# Patient Record
Sex: FEMALE | Race: BLACK OR AFRICAN AMERICAN | Employment: FULL TIME | URBAN - METROPOLITAN AREA
[De-identification: names, ages, dates, MRNs, and addresses within clinical notes are randomized per-mention and may not be internally consistent; named-entity substitution may affect disease eponyms.]

---

## 2018-08-07 ENCOUNTER — OFFICE VISIT (OUTPATIENT)
Dept: INTERNAL MEDICINE CLINIC | Age: 58
End: 2018-08-07

## 2018-08-07 VITALS
HEIGHT: 68 IN | WEIGHT: 222 LBS | HEART RATE: 88 BPM | SYSTOLIC BLOOD PRESSURE: 122 MMHG | DIASTOLIC BLOOD PRESSURE: 80 MMHG | BODY MASS INDEX: 33.65 KG/M2

## 2018-08-07 DIAGNOSIS — G43.109 MIGRAINE WITH AURA AND WITHOUT STATUS MIGRAINOSUS, NOT INTRACTABLE: ICD-10-CM

## 2018-08-07 DIAGNOSIS — E78.5 DYSLIPIDEMIA: ICD-10-CM

## 2018-08-07 DIAGNOSIS — R43.9 DISTURBANCES OF SENSATION OF SMELL AND TASTE: ICD-10-CM

## 2018-08-07 DIAGNOSIS — E11.9 TYPE 2 DIABETES MELLITUS WITHOUT COMPLICATION, WITHOUT LONG-TERM CURRENT USE OF INSULIN (HCC): ICD-10-CM

## 2018-08-07 DIAGNOSIS — Z80.0 FAMILY HISTORY OF COLON CANCER: ICD-10-CM

## 2018-08-07 DIAGNOSIS — Z12.11 COLON CANCER SCREENING: ICD-10-CM

## 2018-08-07 DIAGNOSIS — Z76.89 ENCOUNTER TO ESTABLISH CARE: Primary | ICD-10-CM

## 2018-08-07 PROCEDURE — 99204 OFFICE O/P NEW MOD 45 MIN: CPT | Performed by: NURSE PRACTITIONER

## 2018-08-07 RX ORDER — SUMATRIPTAN 5 MG/1
1 SPRAY NASAL DAILY PRN
COMMUNITY
End: 2019-02-01 | Stop reason: ALTCHOICE

## 2018-08-07 RX ORDER — PRAVASTATIN SODIUM 20 MG
20 TABLET ORAL DAILY
COMMUNITY
Start: 2018-08-05

## 2018-08-07 RX ORDER — RIZATRIPTAN BENZOATE 10 MG/1
10 TABLET ORAL
COMMUNITY
End: 2019-02-01 | Stop reason: ALTCHOICE

## 2018-08-07 RX ORDER — AZELASTINE HCL 205.5 UG/1
SPRAY NASAL
Refills: 1 | COMMUNITY
Start: 2018-06-11 | End: 2019-07-09 | Stop reason: SDUPTHER

## 2018-08-07 RX ORDER — TRANDOLAPRIL AND VERAPAMIL HYDROCHLORIDE 2; 180 MG/1; MG/1
1 TABLET, FILM COATED, EXTENDED RELEASE ORAL DAILY
Refills: 0 | COMMUNITY
Start: 2018-07-25 | End: 2019-02-01 | Stop reason: ALTCHOICE

## 2018-08-07 RX ORDER — FLUTICASONE PROPIONATE 50 MCG
2 SPRAY, SUSPENSION (ML) NASAL DAILY
COMMUNITY
Start: 2018-08-06 | End: 2019-08-14 | Stop reason: SDUPTHER

## 2018-08-07 RX ORDER — CETIRIZINE HYDROCHLORIDE 10 MG/1
TABLET ORAL
Refills: 3 | COMMUNITY
Start: 2018-08-01 | End: 2018-11-26 | Stop reason: SDUPTHER

## 2018-08-07 ASSESSMENT — PATIENT HEALTH QUESTIONNAIRE - PHQ9
SUM OF ALL RESPONSES TO PHQ QUESTIONS 1-9: 0
SUM OF ALL RESPONSES TO PHQ9 QUESTIONS 1 & 2: 0
2. FEELING DOWN, DEPRESSED OR HOPELESS: 0
1. LITTLE INTEREST OR PLEASURE IN DOING THINGS: 0

## 2018-08-07 NOTE — PROGRESS NOTES
SUBJECTIVE:    Patient ID: Yessenia Hernandez is a 65 y.o. female. CC: New patient appointment    HPI: The patient presents to the office to establish with a new primary care provider. She recently relocated to [place] from [place] for work. She was getting regular primary care from Dr. Yolanda Bruce in [place], [place]. She reports a history of diabetes. She feels this is very well controlled. Her glucose this morning was 96. She believes her most recent A1c was 6.0. She tells me her A1c was just done recently. She has a history of dyslipidemia: She is on pravastatin. She is compliant with her medication regimen. Migraines: She tells me she has a history of migraines with aura. She takes Tarka daily for prophylaxis and has used both Imitrex and Maxalt for abortive therapies. She tells me she recently changed some of the food she was eating has noticed a significant improvement in her migraines. Her main complaint today is that she has a sensation of smelling exhaust fumes smell all the time. She smells this at home, outdoors, and at work. This has been occurring for over a year. She has received regular gynecologic care from Dr. Lisa Ruff. Dr. Lisa Ruff was treating the patient for recurrent UTIs. She tells me her last Pap was August 2017 and normal.    She had a colonoscopy 8 years ago which she states was normal.      She is . She has 5 grown children. She works for [employer]    She does not smoke tobacco.  She drinks alcohol rarely. She denies illicit drug use. Past Medical History:   Diagnosis Date    Diabetes mellitus ([address]     Dyslipidemia     Hemorrhoids     History of recurrent UTIs     Migraine with aura         History reviewed. No pertinent surgical history.       Family History   Problem Relation Age of Onset    High Blood Pressure Mother     Diabetes Mother     High Blood Pressure Maternal Grandmother     Cancer Maternal Grandmother     Migraines Maternal Grandmother     Diabetes Maternal Grandfather     Glaucoma Paternal Grandmother        Social History     Social History    Marital status:      Spouse name: N/A    Number of children: N/A    Years of education: N/A     Occupational History    Not on file. Social History Main Topics    Smoking status: Never Smoker    Smokeless tobacco: Never Used    Alcohol use Yes      Comment: rarely    Drug use: No    Sexual activity: Not Currently     Other Topics Concern    Not on file     Social History Narrative    No narrative on file       No current outpatient prescriptions on file prior to visit. No current facility-administered medications on file prior to visit. Allergies   Allergen Reactions    Codeine          Review of Systems   Constitutional: Negative for chills and fever. HENT: Positive for congestion and postnasal drip. Negative for sore throat. Sensation of smelling exhaust fumes   Respiratory: Negative. Cardiovascular: Negative. Gastrointestinal: Negative. Genitourinary: Negative. Skin: Negative. Psychiatric/Behavioral: Negative. All other systems reviewed and are negative. OBJECTIVE:  Physical Exam   Constitutional: She is oriented to person, place, and time. She appears well-developed and well-nourished. No distress. HENT:   Head: Normocephalic and atraumatic. Eyes: Conjunctivae are normal. No scleral icterus. Neck: Neck supple. No JVD present. Cardiovascular: Normal rate and regular rhythm. Pulmonary/Chest: Effort normal and breath sounds normal. No respiratory distress. She has no wheezes. Abdominal: Soft. She exhibits no distension. There is no tenderness. There is no rebound and no guarding. Musculoskeletal: Normal range of motion. She exhibits no edema. Neurological: She is alert and oriented to person, place, and time. Skin: Skin is warm and dry. She is not diaphoretic.    Psychiatric: She has a normal

## 2018-08-09 PROBLEM — E78.5 DYSLIPIDEMIA: Status: ACTIVE | Noted: 2018-08-09

## 2018-08-09 PROBLEM — Z80.0 FAMILY HISTORY OF COLON CANCER: Status: ACTIVE | Noted: 2018-08-09

## 2018-08-09 PROBLEM — G43.109 MIGRAINE WITH AURA AND WITHOUT STATUS MIGRAINOSUS, NOT INTRACTABLE: Status: ACTIVE | Noted: 2018-08-09

## 2018-08-09 PROBLEM — E11.9 TYPE 2 DIABETES MELLITUS WITHOUT COMPLICATION, WITHOUT LONG-TERM CURRENT USE OF INSULIN (HCC): Status: ACTIVE | Noted: 2018-08-09

## 2018-08-09 ASSESSMENT — ENCOUNTER SYMPTOMS
SORE THROAT: 0
RESPIRATORY NEGATIVE: 1
GASTROINTESTINAL NEGATIVE: 1

## 2018-11-27 RX ORDER — CETIRIZINE HYDROCHLORIDE 10 MG/1
TABLET ORAL
Qty: 90 TABLET | Refills: 0 | Status: SHIPPED | OUTPATIENT
Start: 2018-11-27 | End: 2019-05-13 | Stop reason: SDUPTHER

## 2018-12-31 ENCOUNTER — HOSPITAL ENCOUNTER (EMERGENCY)
Age: 58
Discharge: HOME | End: 2018-12-31
Attending: EMERGENCY MEDICINE
Payer: COMMERCIAL

## 2018-12-31 ENCOUNTER — APPOINTMENT (OUTPATIENT)
Dept: GENERAL RADIOLOGY | Age: 58
End: 2018-12-31
Payer: COMMERCIAL

## 2018-12-31 VITALS
TEMPERATURE: 97.8 F | HEIGHT: 67 IN | OXYGEN SATURATION: 97 % | BODY MASS INDEX: 34.53 KG/M2 | DIASTOLIC BLOOD PRESSURE: 75 MMHG | WEIGHT: 220 LBS | HEART RATE: 86 BPM | SYSTOLIC BLOOD PRESSURE: 142 MMHG | RESPIRATION RATE: 17 BRPM
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
## 2018-12-31 DIAGNOSIS — S86.912A STRAIN OF LEFT KNEE AND LEG, INITIAL ENCOUNTER: ICD-10-CM

## 2018-12-31 PROCEDURE — 73552 X-RAY EXAM OF FEMUR 2/>: CPT

## 2018-12-31 PROCEDURE — 73502 X-RAY EXAM HIP UNI 2-3 VIEWS: CPT

## 2018-12-31 PROCEDURE — 6360000002 HC RX W HCPCS: Performed by: EMERGENCY MEDICINE

## 2018-12-31 PROCEDURE — 99283 EMERGENCY DEPT VISIT LOW MDM: CPT

## 2018-12-31 PROCEDURE — 6370000000 HC RX 637 (ALT 250 FOR IP): Performed by: EMERGENCY MEDICINE

## 2018-12-31 PROCEDURE — 96372 THER/PROPH/DIAG INJ SC/IM: CPT

## 2018-12-31 PROCEDURE — 73560 X-RAY EXAM OF KNEE 1 OR 2: CPT

## 2018-12-31 RX ORDER — LIDOCAINE 4 G/G
1 PATCH TOPICAL DAILY
Status: DISCONTINUED | OUTPATIENT
Start: 2018-12-31 | End: 2018-12-31 | Stop reason: HOSPADM

## 2018-12-31 RX ORDER — KETOROLAC TROMETHAMINE 30 MG/ML
15 INJECTION, SOLUTION INTRAMUSCULAR; INTRAVENOUS ONCE
Status: COMPLETED | OUTPATIENT
Start: 2018-12-31 | End: 2018-12-31

## 2018-12-31 RX ORDER — CYCLOBENZAPRINE HCL 10 MG
10 TABLET ORAL 3 TIMES DAILY PRN
Qty: 30 TABLET | Refills: 0 | Status: SHIPPED | OUTPATIENT
Start: 2018-12-31 | End: 2019-01-14 | Stop reason: SDUPTHER

## 2018-12-31 RX ORDER — NAPROXEN 375 MG/1
375 TABLET ORAL 2 TIMES DAILY PRN
Qty: 30 TABLET | Refills: 0 | Status: SHIPPED | OUTPATIENT
Start: 2018-12-31 | End: 2019-01-07

## 2018-12-31 RX ADMIN — KETOROLAC TROMETHAMINE 15 MG: 30 INJECTION, SOLUTION INTRAMUSCULAR at 19:27

## 2018-12-31 ASSESSMENT — PAIN SCALES - GENERAL: PAINLEVEL_OUTOF10: 10

## 2019-01-07 ENCOUNTER — OFFICE VISIT (OUTPATIENT)
Dept: INTERNAL MEDICINE CLINIC | Age: 59
End: 2019-01-07
Payer: COMMERCIAL

## 2019-01-07 VITALS
BODY MASS INDEX: 35.31 KG/M2 | DIASTOLIC BLOOD PRESSURE: 86 MMHG | HEIGHT: 67 IN | WEIGHT: 225 LBS | HEART RATE: 116 BPM | SYSTOLIC BLOOD PRESSURE: 132 MMHG

## 2019-01-07 DIAGNOSIS — M79.605 LEFT LEG PAIN: ICD-10-CM

## 2019-01-07 DIAGNOSIS — S80.12XA HEMATOMA OF LEG, LEFT, INITIAL ENCOUNTER: Primary | ICD-10-CM

## 2019-01-07 DIAGNOSIS — R26.2 UNABLE TO AMBULATE: ICD-10-CM

## 2019-01-07 DIAGNOSIS — E11.9 TYPE 2 DIABETES MELLITUS WITHOUT COMPLICATION, WITHOUT LONG-TERM CURRENT USE OF INSULIN (HCC): ICD-10-CM

## 2019-01-07 LAB — HBA1C MFR BLD: 8.7 %

## 2019-01-07 PROCEDURE — 99213 OFFICE O/P EST LOW 20 MIN: CPT | Performed by: NURSE PRACTITIONER

## 2019-01-07 PROCEDURE — 83036 HEMOGLOBIN GLYCOSYLATED A1C: CPT | Performed by: NURSE PRACTITIONER

## 2019-01-07 RX ORDER — HYDROCODONE BITARTRATE AND ACETAMINOPHEN 5; 325 MG/1; MG/1
1 TABLET ORAL EVERY 8 HOURS PRN
Qty: 20 TABLET | Refills: 0 | Status: SHIPPED | OUTPATIENT
Start: 2019-01-07 | End: 2019-01-14

## 2019-01-07 ASSESSMENT — ENCOUNTER SYMPTOMS: RESPIRATORY NEGATIVE: 1

## 2019-01-10 ENCOUNTER — HOSPITAL ENCOUNTER (OUTPATIENT)
Dept: PHYSICAL THERAPY | Age: 59
Setting detail: THERAPIES SERIES
Discharge: HOME | End: 2019-01-10
Payer: COMMERCIAL

## 2019-01-10 ENCOUNTER — TELEPHONE (OUTPATIENT)
Dept: INTERNAL MEDICINE CLINIC | Age: 59
End: 2019-01-10
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
## 2019-01-10 DIAGNOSIS — R26.2 UNABLE TO AMBULATE: ICD-10-CM

## 2019-01-10 DIAGNOSIS — S76.302A LEFT HAMSTRING INJURY, INITIAL ENCOUNTER: Primary | ICD-10-CM

## 2019-01-10 DIAGNOSIS — M79.652 ACUTE PAIN OF LEFT THIGH: ICD-10-CM

## 2019-01-10 PROCEDURE — 97112 NEUROMUSCULAR REEDUCATION: CPT

## 2019-01-10 PROCEDURE — 97162 PT EVAL MOD COMPLEX 30 MIN: CPT

## 2019-01-10 PROCEDURE — G8979 MOBILITY GOAL STATUS: HCPCS

## 2019-01-10 PROCEDURE — G8978 MOBILITY CURRENT STATUS: HCPCS

## 2019-01-11 ENCOUNTER — TELEPHONE (OUTPATIENT)
Dept: INTERNAL MEDICINE CLINIC | Age: 59
End: 2019-01-11

## 2019-01-14 ENCOUNTER — TELEPHONE (OUTPATIENT)
Dept: INTERNAL MEDICINE CLINIC | Age: 59
End: 2019-01-14

## 2019-01-14 RX ORDER — CYCLOBENZAPRINE HCL 10 MG
10 TABLET ORAL 3 TIMES DAILY PRN
Qty: 30 TABLET | Refills: 0 | Status: SHIPPED | OUTPATIENT
Start: 2019-01-14 | End: 2019-01-24

## 2019-01-16 ENCOUNTER — OFFICE VISIT (OUTPATIENT)
Dept: ORTHOPEDIC SURGERY | Age: 59
End: 2019-01-16
Payer: COMMERCIAL

## 2019-01-16 VITALS
WEIGHT: 224 LBS | SYSTOLIC BLOOD PRESSURE: 124 MMHG | BODY MASS INDEX: 35.16 KG/M2 | HEIGHT: 67 IN | DIASTOLIC BLOOD PRESSURE: 83 MMHG | HEART RATE: 135 BPM

## 2019-01-16 DIAGNOSIS — F40.240 CLAUSTROPHOBIA: ICD-10-CM

## 2019-01-16 DIAGNOSIS — S76.312A TEAR OF LEFT HAMSTRING, INITIAL ENCOUNTER: Primary | ICD-10-CM

## 2019-01-16 PROCEDURE — 99203 OFFICE O/P NEW LOW 30 MIN: CPT | Performed by: ORTHOPAEDIC SURGERY

## 2019-01-24 ENCOUNTER — HOSPITAL ENCOUNTER (OUTPATIENT)
Dept: MRI IMAGING | Age: 59
Discharge: HOME | End: 2019-01-24
Payer: COMMERCIAL

## 2019-01-24 ENCOUNTER — TELEPHONE (OUTPATIENT)
Dept: ORTHOPEDIC SURGERY | Age: 59
End: 2019-01-24

## 2019-01-24 DIAGNOSIS — S76.312A TEAR OF LEFT HAMSTRING, INITIAL ENCOUNTER: ICD-10-CM

## 2019-01-24 RX ORDER — DIAZEPAM 5 MG/1
TABLET ORAL
Qty: 2 TABLET | Refills: 0 | Status: CANCELLED | OUTPATIENT
Start: 2019-01-24 | End: 2019-01-31

## 2019-01-24 RX ORDER — DIAZEPAM 5 MG/1
5 TABLET ORAL ONCE
Qty: 2 TABLET | Refills: 0 | Status: SHIPPED | OUTPATIENT
Start: 2019-01-24 | End: 2019-01-24

## 2019-01-28 ENCOUNTER — TELEPHONE (OUTPATIENT)
Dept: ORTHOPEDIC SURGERY | Age: 59
End: 2019-01-28

## 2019-01-31 ENCOUNTER — OFFICE VISIT (OUTPATIENT)
Dept: ORTHOPEDIC SURGERY | Age: 59
End: 2019-01-31
Payer: COMMERCIAL

## 2019-01-31 VITALS
DIASTOLIC BLOOD PRESSURE: 92 MMHG | HEART RATE: 111 BPM | SYSTOLIC BLOOD PRESSURE: 149 MMHG | WEIGHT: 228 LBS | HEIGHT: 67 IN | RESPIRATION RATE: 17 BRPM | BODY MASS INDEX: 35.79 KG/M2

## 2019-01-31 DIAGNOSIS — S76.312A RUPTURE OF LEFT PROXIMAL HAMSTRING TENDON, INITIAL ENCOUNTER: Primary | ICD-10-CM

## 2019-01-31 PROCEDURE — 99214 OFFICE O/P EST MOD 30 MIN: CPT | Performed by: ORTHOPAEDIC SURGERY

## 2019-01-31 PROCEDURE — E0114 CRUTCH UNDERARM PAIR NO WOOD: HCPCS | Performed by: ORTHOPAEDIC SURGERY

## 2019-01-31 PROCEDURE — L1686 HO POST-OP HIP ABDUCTION: HCPCS | Performed by: ORTHOPAEDIC SURGERY

## 2019-02-01 ENCOUNTER — TELEPHONE (OUTPATIENT)
Dept: ORTHOPEDIC SURGERY | Age: 59
End: 2019-02-01

## 2019-02-04 ENCOUNTER — OFFICE VISIT (OUTPATIENT)
Dept: INTERNAL MEDICINE CLINIC | Age: 59
End: 2019-02-04
Payer: COMMERCIAL

## 2019-02-04 VITALS
HEART RATE: 104 BPM | WEIGHT: 223 LBS | HEIGHT: 67 IN | DIASTOLIC BLOOD PRESSURE: 86 MMHG | BODY MASS INDEX: 35 KG/M2 | SYSTOLIC BLOOD PRESSURE: 138 MMHG

## 2019-02-04 DIAGNOSIS — Z01.818 PRE-OPERATIVE EXAM: Primary | ICD-10-CM

## 2019-02-04 DIAGNOSIS — S76.392A AVULSION OF LEFT HAMSTRING MUSCLE, INITIAL ENCOUNTER: ICD-10-CM

## 2019-02-04 PROCEDURE — 99242 OFF/OP CONSLTJ NEW/EST SF 20: CPT | Performed by: NURSE PRACTITIONER

## 2019-02-05 ENCOUNTER — TELEPHONE (OUTPATIENT)
Dept: ORTHOPEDIC SURGERY | Age: 59
End: 2019-02-05

## 2019-02-06 ENCOUNTER — ANESTHESIA EVENT (OUTPATIENT)
Dept: OPERATING ROOM | Age: 59
End: 2019-02-06
Payer: COMMERCIAL

## 2019-02-06 ENCOUNTER — APPOINTMENT (OUTPATIENT)
Dept: GENERAL RADIOLOGY | Age: 59
End: 2019-02-06
Attending: ORTHOPAEDIC SURGERY
Payer: COMMERCIAL

## 2019-02-06 ENCOUNTER — ANESTHESIA (OUTPATIENT)
Dept: OPERATING ROOM | Age: 59
End: 2019-02-06
Payer: COMMERCIAL

## 2019-02-06 ENCOUNTER — HOSPITAL ENCOUNTER (OUTPATIENT)
Age: 59
Setting detail: OUTPATIENT SURGERY
Discharge: HOME | End: 2019-02-06
Attending: ORTHOPAEDIC SURGERY | Admitting: ORTHOPAEDIC SURGERY
Payer: COMMERCIAL

## 2019-02-06 VITALS
RESPIRATION RATE: 14 BRPM | DIASTOLIC BLOOD PRESSURE: 92 MMHG | TEMPERATURE: 95.9 F | SYSTOLIC BLOOD PRESSURE: 141 MMHG | OXYGEN SATURATION: 100 %

## 2019-02-06 VITALS
SYSTOLIC BLOOD PRESSURE: 140 MMHG | HEIGHT: 67 IN | TEMPERATURE: 98.4 F | DIASTOLIC BLOOD PRESSURE: 87 MMHG | BODY MASS INDEX: 34.48 KG/M2 | RESPIRATION RATE: 16 BRPM | OXYGEN SATURATION: 100 % | HEART RATE: 90 BPM | WEIGHT: 219.7 LBS

## 2019-02-06 DIAGNOSIS — S76.312A COMPLETE RUPTURE OF LEFT PROXIMAL HAMSTRING TENDON, INITIAL ENCOUNTER: Primary | ICD-10-CM

## 2019-02-06 LAB
GLUCOSE BLD-MCNC: 175 MG/DL (ref 70–99)
GLUCOSE BLD-MCNC: 196 MG/DL (ref 70–99)
PERFORMED ON: ABNORMAL
PERFORMED ON: ABNORMAL

## 2019-02-06 PROCEDURE — 3700000001 HC ADD 15 MINUTES (ANESTHESIA): Performed by: ORTHOPAEDIC SURGERY

## 2019-02-06 PROCEDURE — 2500000003 HC RX 250 WO HCPCS: Performed by: NURSE ANESTHETIST, CERTIFIED REGISTERED

## 2019-02-06 PROCEDURE — 2580000003 HC RX 258: Performed by: NURSE ANESTHETIST, CERTIFIED REGISTERED

## 2019-02-06 PROCEDURE — 7100000000 HC PACU RECOVERY - FIRST 15 MIN: Performed by: ORTHOPAEDIC SURGERY

## 2019-02-06 PROCEDURE — 2500000003 HC RX 250 WO HCPCS: Performed by: ORTHOPAEDIC SURGERY

## 2019-02-06 PROCEDURE — 2709999900 HC NON-CHARGEABLE SUPPLY: Performed by: ORTHOPAEDIC SURGERY

## 2019-02-06 PROCEDURE — 7100000010 HC PHASE II RECOVERY - FIRST 15 MIN: Performed by: ORTHOPAEDIC SURGERY

## 2019-02-06 PROCEDURE — 3600000014 HC SURGERY LEVEL 4 ADDTL 15MIN: Performed by: ORTHOPAEDIC SURGERY

## 2019-02-06 PROCEDURE — 2580000003 HC RX 258: Performed by: ORTHOPAEDIC SURGERY

## 2019-02-06 PROCEDURE — 3700000000 HC ANESTHESIA ATTENDED CARE: Performed by: ORTHOPAEDIC SURGERY

## 2019-02-06 PROCEDURE — 7100000011 HC PHASE II RECOVERY - ADDTL 15 MIN: Performed by: ORTHOPAEDIC SURGERY

## 2019-02-06 PROCEDURE — 7100000001 HC PACU RECOVERY - ADDTL 15 MIN: Performed by: ORTHOPAEDIC SURGERY

## 2019-02-06 PROCEDURE — 27385 REPAIR OF THIGH MUSCLE: CPT | Performed by: ORTHOPAEDIC SURGERY

## 2019-02-06 PROCEDURE — 3600000004 HC SURGERY LEVEL 4 BASE: Performed by: ORTHOPAEDIC SURGERY

## 2019-02-06 PROCEDURE — 6360000002 HC RX W HCPCS: Performed by: FAMILY MEDICINE

## 2019-02-06 PROCEDURE — 6360000002 HC RX W HCPCS: Performed by: NURSE ANESTHETIST, CERTIFIED REGISTERED

## 2019-02-06 PROCEDURE — 3209999900 FLUORO FOR SURGICAL PROCEDURES

## 2019-02-06 PROCEDURE — 6370000000 HC RX 637 (ALT 250 FOR IP): Performed by: FAMILY MEDICINE

## 2019-02-06 PROCEDURE — C1713 ANCHOR/SCREW BN/BN,TIS/BN: HCPCS | Performed by: ORTHOPAEDIC SURGERY

## 2019-02-06 PROCEDURE — 6360000002 HC RX W HCPCS: Performed by: ORTHOPAEDIC SURGERY

## 2019-02-06 DEVICE — ANCHOR SUT L14MM OD4.5MM BIOCOMP TWO NO 2 FIBERWIRE 4 NDL: Type: IMPLANTABLE DEVICE | Site: LEG | Status: FUNCTIONAL

## 2019-02-06 DEVICE — ANCHOR SUT L14MM DIA4.5MM BIOCOMP FULL THRD W/ DRL BIT GUID: Type: IMPLANTABLE DEVICE | Site: LEG | Status: FUNCTIONAL

## 2019-02-06 RX ORDER — LABETALOL HYDROCHLORIDE 5 MG/ML
5 INJECTION, SOLUTION INTRAVENOUS EVERY 10 MIN PRN
Status: DISCONTINUED | OUTPATIENT
Start: 2019-02-06 | End: 2019-02-06 | Stop reason: HOSPADM

## 2019-02-06 RX ORDER — HYDROMORPHONE HCL 110MG/55ML
PATIENT CONTROLLED ANALGESIA SYRINGE INTRAVENOUS PRN
Status: DISCONTINUED | OUTPATIENT
Start: 2019-02-06 | End: 2019-02-06 | Stop reason: SDUPTHER

## 2019-02-06 RX ORDER — SODIUM CHLORIDE 9 MG/ML
INJECTION, SOLUTION INTRAVENOUS CONTINUOUS
Status: DISCONTINUED | OUTPATIENT
Start: 2019-02-06 | End: 2019-02-06 | Stop reason: HOSPADM

## 2019-02-06 RX ORDER — SODIUM CHLORIDE 9 MG/ML
INJECTION, SOLUTION INTRAVENOUS CONTINUOUS PRN
Status: DISCONTINUED | OUTPATIENT
Start: 2019-02-06 | End: 2019-02-06 | Stop reason: SDUPTHER

## 2019-02-06 RX ORDER — DIPHENHYDRAMINE HYDROCHLORIDE 50 MG/ML
12.5 INJECTION INTRAMUSCULAR; INTRAVENOUS
Status: DISCONTINUED | OUTPATIENT
Start: 2019-02-06 | End: 2019-02-06 | Stop reason: HOSPADM

## 2019-02-06 RX ORDER — NEOSTIGMINE METHYLSULFATE 1 MG/ML
INJECTION, SOLUTION INTRAVENOUS PRN
Status: DISCONTINUED | OUTPATIENT
Start: 2019-02-06 | End: 2019-02-06 | Stop reason: SDUPTHER

## 2019-02-06 RX ORDER — BUPIVACAINE HYDROCHLORIDE 5 MG/ML
INJECTION, SOLUTION PERINEURAL
Status: COMPLETED | OUTPATIENT
Start: 2019-02-06 | End: 2019-02-06

## 2019-02-06 RX ORDER — PROMETHAZINE HYDROCHLORIDE 25 MG/1
25 TABLET ORAL EVERY 6 HOURS PRN
Qty: 5 TABLET | Refills: 0 | Status: SHIPPED | OUTPATIENT
Start: 2019-02-06 | End: 2019-02-25

## 2019-02-06 RX ORDER — DIPHENHYDRAMINE HYDROCHLORIDE 50 MG/ML
12.5 INJECTION INTRAMUSCULAR; INTRAVENOUS
Status: COMPLETED | OUTPATIENT
Start: 2019-02-06 | End: 2019-02-06

## 2019-02-06 RX ORDER — MAGNESIUM HYDROXIDE 1200 MG/15ML
LIQUID ORAL CONTINUOUS PRN
Status: COMPLETED | OUTPATIENT
Start: 2019-02-06 | End: 2019-02-06

## 2019-02-06 RX ORDER — ONDANSETRON 2 MG/ML
INJECTION INTRAMUSCULAR; INTRAVENOUS PRN
Status: DISCONTINUED | OUTPATIENT
Start: 2019-02-06 | End: 2019-02-06 | Stop reason: SDUPTHER

## 2019-02-06 RX ORDER — PROMETHAZINE HYDROCHLORIDE 25 MG/ML
6.25 INJECTION, SOLUTION INTRAMUSCULAR; INTRAVENOUS PRN
Status: DISCONTINUED | OUTPATIENT
Start: 2019-02-06 | End: 2019-02-06 | Stop reason: HOSPADM

## 2019-02-06 RX ORDER — HYDROMORPHONE HCL 110MG/55ML
0.5 PATIENT CONTROLLED ANALGESIA SYRINGE INTRAVENOUS EVERY 5 MIN PRN
Status: DISCONTINUED | OUTPATIENT
Start: 2019-02-06 | End: 2019-02-06 | Stop reason: HOSPADM

## 2019-02-06 RX ORDER — HYDROMORPHONE HCL 110MG/55ML
0.25 PATIENT CONTROLLED ANALGESIA SYRINGE INTRAVENOUS EVERY 5 MIN PRN
Status: DISCONTINUED | OUTPATIENT
Start: 2019-02-06 | End: 2019-02-06 | Stop reason: HOSPADM

## 2019-02-06 RX ORDER — SODIUM CHLORIDE 0.9 % (FLUSH) 0.9 %
10 SYRINGE (ML) INJECTION PRN
Status: DISCONTINUED | OUTPATIENT
Start: 2019-02-06 | End: 2019-02-06 | Stop reason: HOSPADM

## 2019-02-06 RX ORDER — CEFAZOLIN SODIUM 2 G/100ML
2 INJECTION, SOLUTION INTRAVENOUS
Status: COMPLETED | OUTPATIENT
Start: 2019-02-06 | End: 2019-02-06

## 2019-02-06 RX ORDER — SUCCINYLCHOLINE/SOD CL,ISO/PF 100 MG/5ML
SYRINGE (ML) INTRAVENOUS PRN
Status: DISCONTINUED | OUTPATIENT
Start: 2019-02-06 | End: 2019-02-06 | Stop reason: SDUPTHER

## 2019-02-06 RX ORDER — OXYCODONE HYDROCHLORIDE 5 MG/1
10 TABLET ORAL PRN
Status: COMPLETED | OUTPATIENT
Start: 2019-02-06 | End: 2019-02-06

## 2019-02-06 RX ORDER — SODIUM CHLORIDE 0.9 % (FLUSH) 0.9 %
10 SYRINGE (ML) INJECTION EVERY 12 HOURS SCHEDULED
Status: DISCONTINUED | OUTPATIENT
Start: 2019-02-06 | End: 2019-02-06 | Stop reason: HOSPADM

## 2019-02-06 RX ORDER — MIDAZOLAM HYDROCHLORIDE 5 MG/ML
INJECTION INTRAMUSCULAR; INTRAVENOUS PRN
Status: DISCONTINUED | OUTPATIENT
Start: 2019-02-06 | End: 2019-02-06 | Stop reason: SDUPTHER

## 2019-02-06 RX ORDER — PROPOFOL 10 MG/ML
INJECTION, EMULSION INTRAVENOUS PRN
Status: DISCONTINUED | OUTPATIENT
Start: 2019-02-06 | End: 2019-02-06 | Stop reason: SDUPTHER

## 2019-02-06 RX ORDER — LIDOCAINE HYDROCHLORIDE 20 MG/ML
INJECTION, SOLUTION EPIDURAL; INFILTRATION; INTRACAUDAL; PERINEURAL PRN
Status: DISCONTINUED | OUTPATIENT
Start: 2019-02-06 | End: 2019-02-06 | Stop reason: SDUPTHER

## 2019-02-06 RX ORDER — FENTANYL CITRATE 50 UG/ML
INJECTION, SOLUTION INTRAMUSCULAR; INTRAVENOUS PRN
Status: DISCONTINUED | OUTPATIENT
Start: 2019-02-06 | End: 2019-02-06 | Stop reason: SDUPTHER

## 2019-02-06 RX ORDER — ROCURONIUM BROMIDE 10 MG/ML
INJECTION, SOLUTION INTRAVENOUS PRN
Status: DISCONTINUED | OUTPATIENT
Start: 2019-02-06 | End: 2019-02-06 | Stop reason: SDUPTHER

## 2019-02-06 RX ORDER — OXYCODONE HYDROCHLORIDE AND ACETAMINOPHEN 5; 325 MG/1; MG/1
1 TABLET ORAL EVERY 4 HOURS PRN
Qty: 40 TABLET | Refills: 0 | Status: SHIPPED | OUTPATIENT
Start: 2019-02-06 | End: 2019-02-13

## 2019-02-06 RX ORDER — LIDOCAINE HYDROCHLORIDE 10 MG/ML
0.5 INJECTION, SOLUTION EPIDURAL; INFILTRATION; INTRACAUDAL; PERINEURAL ONCE
Status: DISCONTINUED | OUTPATIENT
Start: 2019-02-06 | End: 2019-02-06 | Stop reason: HOSPADM

## 2019-02-06 RX ORDER — FENTANYL CITRATE 50 UG/ML
50 INJECTION, SOLUTION INTRAMUSCULAR; INTRAVENOUS EVERY 5 MIN PRN
Status: DISCONTINUED | OUTPATIENT
Start: 2019-02-06 | End: 2019-02-06 | Stop reason: HOSPADM

## 2019-02-06 RX ORDER — GLYCOPYRROLATE 0.2 MG/ML
INJECTION INTRAMUSCULAR; INTRAVENOUS PRN
Status: DISCONTINUED | OUTPATIENT
Start: 2019-02-06 | End: 2019-02-06 | Stop reason: SDUPTHER

## 2019-02-06 RX ORDER — MEPERIDINE HYDROCHLORIDE 25 MG/ML
12.5 INJECTION INTRAMUSCULAR; INTRAVENOUS; SUBCUTANEOUS EVERY 5 MIN PRN
Status: DISCONTINUED | OUTPATIENT
Start: 2019-02-06 | End: 2019-02-06 | Stop reason: HOSPADM

## 2019-02-06 RX ORDER — OXYCODONE HYDROCHLORIDE 5 MG/1
5 TABLET ORAL PRN
Status: COMPLETED | OUTPATIENT
Start: 2019-02-06 | End: 2019-02-06

## 2019-02-06 RX ORDER — DEXAMETHASONE SODIUM PHOSPHATE 4 MG/ML
INJECTION, SOLUTION INTRA-ARTICULAR; INTRALESIONAL; INTRAMUSCULAR; INTRAVENOUS; SOFT TISSUE PRN
Status: DISCONTINUED | OUTPATIENT
Start: 2019-02-06 | End: 2019-02-06 | Stop reason: SDUPTHER

## 2019-02-06 RX ADMIN — HYDROMORPHONE HYDROCHLORIDE 0.5 MG: 2 INJECTION, SOLUTION INTRAMUSCULAR; INTRAVENOUS; SUBCUTANEOUS at 09:23

## 2019-02-06 RX ADMIN — Medication 100 MG: at 07:34

## 2019-02-06 RX ADMIN — ONDANSETRON 4 MG: 2 INJECTION INTRAMUSCULAR; INTRAVENOUS at 09:05

## 2019-02-06 RX ADMIN — ROCURONIUM BROMIDE 5 MG: 10 INJECTION, SOLUTION INTRAVENOUS at 07:34

## 2019-02-06 RX ADMIN — PROPOFOL 200 MG: 10 INJECTION, EMULSION INTRAVENOUS at 07:34

## 2019-02-06 RX ADMIN — FENTANYL CITRATE 25 MCG: 50 INJECTION, SOLUTION INTRAMUSCULAR; INTRAVENOUS at 08:09

## 2019-02-06 RX ADMIN — ROCURONIUM BROMIDE 25 MG: 10 INJECTION, SOLUTION INTRAVENOUS at 08:00

## 2019-02-06 RX ADMIN — SODIUM CHLORIDE: 9 INJECTION, SOLUTION INTRAVENOUS at 06:49

## 2019-02-06 RX ADMIN — LIDOCAINE HYDROCHLORIDE 60 MG: 20 INJECTION, SOLUTION EPIDURAL; INFILTRATION; INTRACAUDAL; PERINEURAL at 07:34

## 2019-02-06 RX ADMIN — OXYCODONE HYDROCHLORIDE 5 MG: 5 TABLET ORAL at 11:36

## 2019-02-06 RX ADMIN — DEXAMETHASONE SODIUM PHOSPHATE 4 MG: 4 INJECTION, SOLUTION INTRAMUSCULAR; INTRAVENOUS at 07:57

## 2019-02-06 RX ADMIN — FENTANYL CITRATE 100 MCG: 50 INJECTION, SOLUTION INTRAMUSCULAR; INTRAVENOUS at 07:34

## 2019-02-06 RX ADMIN — GLYCOPYRROLATE 0.4 MG: 0.2 INJECTION, SOLUTION INTRAMUSCULAR; INTRAVENOUS at 09:23

## 2019-02-06 RX ADMIN — MIDAZOLAM HYDROCHLORIDE 2 MG: 5 INJECTION INTRAMUSCULAR; INTRAVENOUS at 07:29

## 2019-02-06 RX ADMIN — FENTANYL CITRATE 50 MCG: 50 INJECTION, SOLUTION INTRAMUSCULAR; INTRAVENOUS at 07:57

## 2019-02-06 RX ADMIN — HYDROMORPHONE HYDROCHLORIDE 0.25 MG: 2 INJECTION, SOLUTION INTRAMUSCULAR; INTRAVENOUS; SUBCUTANEOUS at 08:30

## 2019-02-06 RX ADMIN — HYDROMORPHONE HYDROCHLORIDE 0.5 MG: 2 INJECTION INTRAMUSCULAR; INTRAVENOUS; SUBCUTANEOUS at 10:11

## 2019-02-06 RX ADMIN — HYDROMORPHONE HYDROCHLORIDE 0.25 MG: 2 INJECTION, SOLUTION INTRAMUSCULAR; INTRAVENOUS; SUBCUTANEOUS at 08:24

## 2019-02-06 RX ADMIN — HYDROMORPHONE HYDROCHLORIDE 0.5 MG: 2 INJECTION, SOLUTION INTRAMUSCULAR; INTRAVENOUS; SUBCUTANEOUS at 09:11

## 2019-02-06 RX ADMIN — DIPHENHYDRAMINE HYDROCHLORIDE 12.5 MG: 50 INJECTION, SOLUTION INTRAMUSCULAR; INTRAVENOUS at 10:33

## 2019-02-06 RX ADMIN — FENTANYL CITRATE 25 MCG: 50 INJECTION, SOLUTION INTRAMUSCULAR; INTRAVENOUS at 08:02

## 2019-02-06 RX ADMIN — FENTANYL CITRATE 50 MCG: 50 INJECTION, SOLUTION INTRAMUSCULAR; INTRAVENOUS at 08:22

## 2019-02-06 RX ADMIN — SODIUM CHLORIDE: 9 INJECTION, SOLUTION INTRAVENOUS at 08:55

## 2019-02-06 RX ADMIN — HYDROMORPHONE HYDROCHLORIDE 0.5 MG: 2 INJECTION INTRAMUSCULAR; INTRAVENOUS; SUBCUTANEOUS at 09:59

## 2019-02-06 RX ADMIN — CEFAZOLIN SODIUM 2 G: 2 INJECTION, SOLUTION INTRAVENOUS at 07:26

## 2019-02-06 RX ADMIN — Medication 3 MG: at 09:23

## 2019-02-06 RX ADMIN — SODIUM CHLORIDE: 9 INJECTION, SOLUTION INTRAVENOUS at 07:29

## 2019-02-06 ASSESSMENT — PULMONARY FUNCTION TESTS
PIF_VALUE: 22
PIF_VALUE: 27
PIF_VALUE: 3
PIF_VALUE: 1
PIF_VALUE: 23
PIF_VALUE: 14
PIF_VALUE: 22
PIF_VALUE: 19
PIF_VALUE: 10
PIF_VALUE: 22
PIF_VALUE: 12
PIF_VALUE: 23
PIF_VALUE: 22
PIF_VALUE: 20
PIF_VALUE: 23
PIF_VALUE: 2
PIF_VALUE: 12
PIF_VALUE: 12
PIF_VALUE: 22
PIF_VALUE: 27
PIF_VALUE: 23
PIF_VALUE: 21
PIF_VALUE: 22
PIF_VALUE: 12
PIF_VALUE: 22
PIF_VALUE: 22
PIF_VALUE: 24
PIF_VALUE: 2
PIF_VALUE: 22
PIF_VALUE: 1
PIF_VALUE: 2
PIF_VALUE: 22
PIF_VALUE: 14
PIF_VALUE: 22
PIF_VALUE: 23
PIF_VALUE: 37
PIF_VALUE: 23
PIF_VALUE: 22
PIF_VALUE: 0
PIF_VALUE: 22
PIF_VALUE: 12
PIF_VALUE: 12
PIF_VALUE: 27
PIF_VALUE: 22
PIF_VALUE: 21
PIF_VALUE: 12
PIF_VALUE: 4
PIF_VALUE: 22
PIF_VALUE: 2
PIF_VALUE: 12
PIF_VALUE: 21
PIF_VALUE: 13
PIF_VALUE: 23
PIF_VALUE: 22
PIF_VALUE: 22
PIF_VALUE: 13
PIF_VALUE: 22
PIF_VALUE: 23
PIF_VALUE: 22
PIF_VALUE: 22
PIF_VALUE: 18
PIF_VALUE: 22
PIF_VALUE: 23
PIF_VALUE: 22
PIF_VALUE: 1
PIF_VALUE: 23
PIF_VALUE: 22
PIF_VALUE: 3
PIF_VALUE: 22
PIF_VALUE: 0
PIF_VALUE: 22
PIF_VALUE: 12
PIF_VALUE: 11
PIF_VALUE: 22
PIF_VALUE: 14
PIF_VALUE: 17
PIF_VALUE: 25
PIF_VALUE: 24
PIF_VALUE: 22
PIF_VALUE: 24
PIF_VALUE: 15
PIF_VALUE: 19
PIF_VALUE: 18
PIF_VALUE: 18
PIF_VALUE: 23
PIF_VALUE: 22
PIF_VALUE: 14
PIF_VALUE: 21
PIF_VALUE: 22
PIF_VALUE: 23
PIF_VALUE: 13
PIF_VALUE: 24
PIF_VALUE: 2
PIF_VALUE: 3
PIF_VALUE: 22
PIF_VALUE: 22
PIF_VALUE: 21
PIF_VALUE: 21
PIF_VALUE: 22
PIF_VALUE: 15
PIF_VALUE: 20
PIF_VALUE: 22
PIF_VALUE: 22
PIF_VALUE: 3
PIF_VALUE: 22
PIF_VALUE: 24
PIF_VALUE: 1
PIF_VALUE: 11

## 2019-02-06 ASSESSMENT — PAIN DESCRIPTION - PAIN TYPE
TYPE: SURGICAL PAIN
TYPE: SURGICAL PAIN

## 2019-02-06 ASSESSMENT — PAIN DESCRIPTION - LOCATION: LOCATION: LEG

## 2019-02-06 ASSESSMENT — PAIN SCALES - GENERAL
PAINLEVEL_OUTOF10: 7
PAINLEVEL_OUTOF10: 10
PAINLEVEL_OUTOF10: 10
PAINLEVEL_OUTOF10: 4
PAINLEVEL_OUTOF10: 3

## 2019-02-06 ASSESSMENT — PAIN - FUNCTIONAL ASSESSMENT: PAIN_FUNCTIONAL_ASSESSMENT: 0-10

## 2019-02-06 ASSESSMENT — PAIN DESCRIPTION - DESCRIPTORS: DESCRIPTORS: ACHING;CONSTANT

## 2019-02-06 ASSESSMENT — PAIN DESCRIPTION - ORIENTATION: ORIENTATION: LEFT

## 2019-02-13 ENCOUNTER — APPOINTMENT (OUTPATIENT)
Dept: PHYSICAL THERAPY | Age: 59
End: 2019-02-13
Payer: COMMERCIAL

## 2019-02-21 ENCOUNTER — OFFICE VISIT (OUTPATIENT)
Dept: ORTHOPEDIC SURGERY | Age: 59
End: 2019-02-21

## 2019-02-21 VITALS — WEIGHT: 220 LBS | BODY MASS INDEX: 34.53 KG/M2 | RESPIRATION RATE: 16 BRPM | HEIGHT: 67 IN

## 2019-02-21 DIAGNOSIS — S76.312D COMPLETE RUPTURE OF LEFT PROXIMAL HAMSTRING TENDON, SUBSEQUENT ENCOUNTER: Primary | ICD-10-CM

## 2019-02-21 PROCEDURE — 99024 POSTOP FOLLOW-UP VISIT: CPT | Performed by: ORTHOPAEDIC SURGERY

## 2019-02-25 ENCOUNTER — TELEPHONE (OUTPATIENT)
Dept: ORTHOPEDIC SURGERY | Age: 59
End: 2019-02-25

## 2019-02-25 ENCOUNTER — HOSPITAL ENCOUNTER (OUTPATIENT)
Dept: PHYSICAL THERAPY | Age: 59
Setting detail: THERAPIES SERIES
Discharge: HOME | End: 2019-02-25
Payer: COMMERCIAL

## 2019-02-25 DIAGNOSIS — S76.312D COMPLETE RUPTURE OF LEFT PROXIMAL HAMSTRING TENDON, SUBSEQUENT ENCOUNTER: Primary | ICD-10-CM

## 2019-02-25 PROCEDURE — 97530 THERAPEUTIC ACTIVITIES: CPT

## 2019-02-25 PROCEDURE — 97110 THERAPEUTIC EXERCISES: CPT

## 2019-02-25 PROCEDURE — 97162 PT EVAL MOD COMPLEX 30 MIN: CPT

## 2019-02-25 RX ORDER — OXYCODONE HYDROCHLORIDE AND ACETAMINOPHEN 5; 325 MG/1; MG/1
1 TABLET ORAL EVERY 8 HOURS PRN
Qty: 21 TABLET | Refills: 0 | Status: SHIPPED | OUTPATIENT
Start: 2019-02-25 | End: 2019-03-04

## 2019-02-25 ASSESSMENT — PAIN SCALES - GENERAL: PAINLEVEL_OUTOF10: 9

## 2019-02-25 ASSESSMENT — PAIN DESCRIPTION - PAIN TYPE: TYPE: SURGICAL PAIN

## 2019-03-04 ENCOUNTER — HOSPITAL ENCOUNTER (OUTPATIENT)
Dept: PHYSICAL THERAPY | Age: 59
Setting detail: THERAPIES SERIES
Discharge: HOME | End: 2019-03-04
Payer: COMMERCIAL

## 2019-03-04 PROCEDURE — 97140 MANUAL THERAPY 1/> REGIONS: CPT

## 2019-03-04 PROCEDURE — 97110 THERAPEUTIC EXERCISES: CPT

## 2019-03-12 ENCOUNTER — HOSPITAL ENCOUNTER (OUTPATIENT)
Dept: PHYSICAL THERAPY | Age: 59
Setting detail: THERAPIES SERIES
Discharge: HOME | End: 2019-03-12
Payer: COMMERCIAL

## 2019-03-12 PROCEDURE — 97110 THERAPEUTIC EXERCISES: CPT

## 2019-03-12 PROCEDURE — 97112 NEUROMUSCULAR REEDUCATION: CPT

## 2019-03-22 ENCOUNTER — HOSPITAL ENCOUNTER (OUTPATIENT)
Dept: PHYSICAL THERAPY | Age: 59
Setting detail: THERAPIES SERIES
Discharge: HOME | End: 2019-03-22
Payer: COMMERCIAL

## 2019-03-22 PROCEDURE — 97113 AQUATIC THERAPY/EXERCISES: CPT

## 2019-03-22 PROCEDURE — 97530 THERAPEUTIC ACTIVITIES: CPT

## 2019-03-26 ENCOUNTER — HOSPITAL ENCOUNTER (OUTPATIENT)
Dept: PHYSICAL THERAPY | Age: 59
Setting detail: THERAPIES SERIES
Discharge: HOME | End: 2019-03-26
Payer: COMMERCIAL

## 2019-03-26 PROCEDURE — 97113 AQUATIC THERAPY/EXERCISES: CPT

## 2019-03-29 ENCOUNTER — HOSPITAL ENCOUNTER (OUTPATIENT)
Dept: PHYSICAL THERAPY | Age: 59
Setting detail: THERAPIES SERIES
Discharge: HOME | End: 2019-03-29
Payer: COMMERCIAL

## 2019-03-29 PROCEDURE — 97110 THERAPEUTIC EXERCISES: CPT

## 2019-03-29 PROCEDURE — 97116 GAIT TRAINING THERAPY: CPT

## 2019-04-02 ENCOUNTER — HOSPITAL ENCOUNTER (OUTPATIENT)
Dept: PHYSICAL THERAPY | Age: 59
Setting detail: THERAPIES SERIES
Discharge: HOME | End: 2019-04-02
Payer: COMMERCIAL

## 2019-04-02 PROCEDURE — 97113 AQUATIC THERAPY/EXERCISES: CPT

## 2019-04-02 NOTE — FLOWSHEET NOTE
Physical Therapy Daily Treatment Note  Date:  2019    Patient Name:  Shirley Whitfield    :  1960  MRN: [id number]     Restrictions/Precautions:  WBAT BEYOND 6 WEEKS (3/20/19) (WITH WEANING FROM CRUTCHES)  BRACE UNLOCKED FULLY AT THIS TIME; WEAN OUT OF BRACE IF STRENGTH/GAIT IS SUFFICIENT WITHOUT DEVIATION/WEAKNESS/GIVEOUTS/PAIN    Medical/Treatment Diagnosis Information:   · Diagnosis: S76.312D - Complete rupture of (L) proximal hamstring tendon, subsequent - 3 OF 4 HAMSTRINGS SURGICALLY REPAIRED ON 19  · Treatment Diagnosis: difficulty walking, L proximal hamstring repair, decreased strength, decreased flexibility, impaired balance      Tracking Information:  Physician Information Referring Practitioner: Charanjit Del Rosario MD     Plan of Care Sent Date:  Signed Received: Y   Visit Count / Total Visits      Insurance Approved Visits    Approved Dates:     Insurance Information PT Insurance Information: [address] then precert     Progress Note/G-codes   []  Yes  [x]  No Next Due: #10     Pain level: 2/10     Subjective: Patient states that she is improving in her ability to bear weight through her LLE. She is no longer using the unilateral crutch when ambulating at home, but continues to use it with community ambulation. Still some adductor irritability and difficulty sitting on ischial tuberosities. The patient will be 8 weeks s/p on 4/3/19. Objective:    Observation:    - patient able to perform step up/over fwd & retro using LLE in pool  3/19 - Pt demos incorrect motor pattern with mini-squats as evidenced by the inability to maintain upright chest position and the knees traveling too far anterior over the toes.   3/22 - patient able to ambulate WBAT in pool    3/12 - incision healing without signs of infection/irritation; small 1cm x 1cm scab formed over middle of incision; patient's skin hypersensitive in area of incision  3/4 - proper form with standing Ups    Hip Flexion (SLR) Figure 8's    Hip aBduction (SLR) Bilateral Pull Downs    Hip Extension (SLR)      Hip aDduction (SLR)      Hip Circles  Functional:    Hip IR/Er  Step up/over forward and backward 10x R/L ea   TrA Set   Step up/over lateral  10x R/L ea   Pelvic Tilts   Step down     Fig 8's   Lunges Forward    LE PNF  Lunges Retro    Toe Raises    Lunges Lateral     Balance:   Lower ab curl with noodle     SLS   Step forward/backward with fwd lunge  10x L only   Tandem Stance       NBOS eyes open  Seated:     NBOS eyes closed  Ankle pumps     Hand to Opposite Knee  Ankle Circles     Fwd Step ups to SLS  Knee Flex/Ext    Lateral Step ups to SLS  Hip aBd/aDd    Stop/Go Gait   Bicycle       Ankle DF/PF      Ankle Inv/Ev    Stretching:       Gastroc/Soleus 2 x 30'' L     Hamstring  ! GENTLE! 2 x 30'' L on step  Noodle: Adductor Stretch 2 x 30'' L     Knee Flex Stretch  Leg Press    Piriformis   Noodle Hang at [employer]    Hip Flexor  Noodle Hang Deep Water    SKTC  Noodle Bicycle     DKTC       ITB      Quad  Deep Water:    Mid Back   Jog    UT  Jumping Jacks    Post Shoulder  Heel to Toe    Ladder Pull  Hand to Opposite Knee    Pec Stretch  Rocking Horse      [employer] Fwd Cycling      Cervical:   Other:     AROM Flexion  Fwd/Retro Stepping    AROM Extension      AROM Side Bending      AROM Rotation      Chin Tucks      Chin Nods        Aquatic Abbreviation Key  B= Belt DB= Dumbells T= Theratube   H= Hydrotone N= Noodles W= Weights   P= Paddles S= Speedo equipment K= Kickboard     Other Therapeutic Activities:    3/29 - Gait training was performed on this date w/o AD in order to increase gait speed, normalize arm swing, and improve heel strike/push-off with the LLE. Tactile and verbal cues were used in order to normalize gait mechanics. Patient demo'd good carryover at end of session. - 12'  3/26 - PT adjusted brace per MD recommendations (hip unlocked fully).  Patient educated on exercising continued caution when out of brace. 3/22 - patient educated on WBAT, progression of WB, POC, scheduling aqua/dryland, and precautions at this time. Patient was given tour of pool, instructed on use of lockers and shown PT aquatic equipment. 2/26 - Patient educated on the focus of skilled physical therapy services and plan of care, including: diagnosis, prognosis, treatment goals & options. Patient educated to continue to wear brace at night with sleeping; encouraged patient to continue to try to sit normally. Patient encouraged/educated to maintain touch down weight bearing at this time, and was educated on reducing axillary compression with B crutches. Home Exercise Program:     3/29 - Patient instructed to continue gentle HSS and gasroc stretch. HF stretch not appropriate at this time as patient demos functional hip extension ROM and does not feel stretch with such activity. 3/26 - The following exercises were performed and added to the patient's home exercise program. (HSS GENTLE, gastroc S). Additionally, the patient was educated on appropriate frequency, intensity and duration. Handout provided. 3/12 - The following exercises were performed and added to the patient's home exercise program. (SLR abd, sidelying clamshells, adductor isometrics, prone knee flexion [pain free], prone SLR ext [pain free]), Additionally, the patient was educated on appropriate frequency, intensity and duration. Patient heavily edu'd to avoid pain, even slightly with prone knee flexion or prone SLR ext.  3/4 - The following exercises were performed and added to the patient's home exercise program. (SLR abd, SLR ext (in standing), standing TKE, glute sets). Additionally, the patient was educated on appropriate frequency, intensity and duration. Handout provided. 2/26 - The following exercises were performed and added to the patient's home exercise program (arabella pumps, quad sets, abdominal isometrics, prone laying).

## 2019-04-04 ENCOUNTER — OFFICE VISIT (OUTPATIENT)
Dept: ORTHOPEDIC SURGERY | Age: 59
End: 2019-04-04

## 2019-04-04 VITALS — RESPIRATION RATE: 17 BRPM | WEIGHT: 220.02 LBS | HEART RATE: 74 BPM | BODY MASS INDEX: 34.53 KG/M2 | HEIGHT: 67 IN

## 2019-04-04 DIAGNOSIS — S76.312D COMPLETE RUPTURE OF LEFT PROXIMAL HAMSTRING TENDON, SUBSEQUENT ENCOUNTER: Primary | ICD-10-CM

## 2019-04-04 PROCEDURE — 99024 POSTOP FOLLOW-UP VISIT: CPT | Performed by: ORTHOPAEDIC SURGERY

## 2019-04-04 NOTE — PROGRESS NOTES
History:  Rubina Jones is here for follow up after left proximal hamstring repair. Surgery date was 2/6/19. Pain is controlled with current analgesics. Medication(s) being used: Percocet. The patient's pain is rated at 7/10. She is still in brace. She has been doing PT at [facility]. Pain is in her buttocks when she is sitting. Physical Examination:  Pulse 74   Resp 17   Ht 5' 7.01\" (1.702 m)   Wt 220 lb 0.3 oz (99.8 kg)   BMI 34.45 kg/m²    Patient is awake, alert, and in no acute distress. Incision healing. Dorsiflexion / plantarflexion intact bilaterally. SILT bilateral feet. Bilateral feet WWP. Assessment:   2 months status post left proximal hamstring repair        Plan: Ice prn to ischial tuberosity. Heat prn to hamstring muscle. Wean from brace when normal gait and adequate strength. Refill pain medications as needed. No NSAIDs. Continue PT. Follow up in 2 months for evaluation of progress or prn if problems.

## 2019-04-04 NOTE — LETTER
[address]  Phone: [phone]  Fax: [phone]    Balke Stover MD        April 4, 2019     Patient: Zane Melgar   YOB: 1960   Date of Visit: 4/4/2019       To Whom It May Concern: It is my medical opinion that Mel Miah may return to work 4/22/19. If you have any questions or concerns, please don't hesitate to call.     Sincerely,        Blake Stover MD

## 2019-04-05 ENCOUNTER — HOSPITAL ENCOUNTER (OUTPATIENT)
Dept: PHYSICAL THERAPY | Age: 59
Setting detail: THERAPIES SERIES
Discharge: HOME | End: 2019-04-05
Payer: COMMERCIAL

## 2019-04-05 PROCEDURE — 97110 THERAPEUTIC EXERCISES: CPT

## 2019-04-05 PROCEDURE — 97112 NEUROMUSCULAR REEDUCATION: CPT

## 2019-04-05 NOTE — FLOWSHEET NOTE
Physical Therapy Daily Treatment Note  Date:  2019    Patient Name:  Diamond Mercado    :  1960  MRN: [id number]     Restrictions/Precautions:  WBAT BEYOND 6 WEEKS (3/20/19) (WITH WEANING FROM CRUTCHES)  BRACE UNLOCKED FULLY AT THIS TIME; WEAN OUT OF BRACE IF STRENGTH/GAIT IS SUFFICIENT WITHOUT DEVIATION/WEAKNESS/GIVEOUTS/PAIN    Medical/Treatment Diagnosis Information:   · Diagnosis: S76.312D - Complete rupture of (L) proximal hamstring tendon, subsequent - 3 OF 4 HAMSTRINGS SURGICALLY REPAIRED ON 19  · Treatment Diagnosis: difficulty walking, L proximal hamstring repair, decreased strength, decreased flexibility, impaired balance      Tracking Information:  Physician Information Referring Practitioner: Jasmin Garcia MD     Plan of Care Sent Date:  Signed Received: Y   Visit Count / Total Visits      Insurance Approved Visits    Approved Dates:     Insurance Information PT Insurance Information: [address] then precert     Progress Note/G-codes   []  Yes  [x]  No Next Due: #10     Pain level: 0/10      Subjective:    - Patient states that she is feeling better with each day. She is tolerating sitting positions better and states she no longer needs to sit on the ice. Patient visited her doctor yesterday and she is scheduled to return to work on 19. Objective:    Observation:     - patient able to perform step up/over fwd & retro using LLE in pool  3/19 - Pt demos incorrect motor pattern with mini-squats as evidenced by the inability to maintain upright chest position and the knees traveling too far anterior over the toes.   3/22 - patient able to ambulate WBAT in pool    3/12 - incision healing without signs of infection/irritation; small 1cm x 1cm scab formed over middle of incision; patient's skin hypersensitive in area of incision  3/4 - proper form with standing exercises   Test measurements:     - Left Hamstring 90/90 flexibility = paddle   Elbow Flex/Ext       Shldr Flex/Ext       Shldr aBd/aDd    LE Exercises:  Shldr Horiz aBd/aDd    HR  15x B, 15x L single Shldr IR/ER    Marches  15x R/L Arm Circles    Mini Squats  15x (emphasis on glute set in standing) PNF Diagonals    Hamstring Curls  20x L Wall Push Ups    Hip Flexion (SLR) Figure 8's    Hip aBduction (SLR) Bilateral Pull Downs    Hip Extension (SLR)      Hip aDduction (SLR)      Hip Circles  Functional:    Hip IR/Er  Step up/over forward and backward 10x R/L ea   TrA Set   Step up/over lateral  10x R/L ea   Pelvic Tilts   Step down     Fig 8's   Lunges Forward    LE PNF  Lunges Retro    Toe Raises    Lunges Lateral     Balance:   Lower ab curl with noodle     SLS   Step forward/backward with fwd lunge  10x L only   Tandem Stance       NBOS eyes open  Seated:     NBOS eyes closed  Ankle pumps     Hand to Opposite Knee  Ankle Circles     Fwd Step ups to SLS  Knee Flex/Ext    Lateral Step ups to SLS  Hip aBd/aDd    Stop/Go Gait   Bicycle       Ankle DF/PF      Ankle Inv/Ev    Stretching:       Gastroc/Soleus 2 x 30'' L     Hamstring  ! GENTLE! 2 x 30'' L on step  Noodle:      Adductor Stretch 2 x 30'' L     Knee Flex Stretch  Leg Press    Piriformis   Noodle Hang at [employer]    Hip Flexor  Noodle Hang Deep Water    SKTC  Noodle Bicycle     DKTC       ITB      Quad  Deep Water:    Mid Back   Jog    UT  Jumping Jacks    Post Shoulder  Heel to Toe    Ladder Pull  Hand to Opposite Knee    Pec Stretch  Rocking Horse      [employer] Fwd Cycling      Cervical:   Other:     AROM Flexion  Fwd/Retro Stepping    AROM Extension      AROM Side Bending      AROM Rotation      Chin Tucks      Chin Nods        Aquatic Abbreviation Key  B= Belt DB= Dumbells T= Theratube   H= Hydrotone N= Noodles W= Weights   P= Paddles S= Speedo equipment K= Kickboard     Other Therapeutic Activities:    3/29 - Gait training was performed on this date w/o AD in order to increase gait speed, normalize arm swing, and improve heel strike/push-off with the LLE. Tactile and verbal cues were used in order to normalize gait mechanics. Patient demo'd good carryover at end of session. - 12'  3/26 - PT adjusted brace per MD recommendations (hip unlocked fully). Patient educated on exercising continued caution when out of brace. 3/22 - patient educated on WBAT, progression of WB, POC, scheduling aqua/dryland, and precautions at this time. Patient was given tour of pool, instructed on use of lockers and shown PT aquatic equipment. 2/26 - Patient educated on the focus of skilled physical therapy services and plan of care, including: diagnosis, prognosis, treatment goals & options. Patient educated to continue to wear brace at night with sleeping; encouraged patient to continue to try to sit normally. Patient encouraged/educated to maintain touch down weight bearing at this time, and was educated on reducing axillary compression with B crutches. Home Exercise Program:     4/5 - The following exercises were performed and added to the patient's home exercise program. (SKTC, piriformis S, SB bridges, SB straight leg bridges, SB HS Curls, SB reverse crunch). Additionally, the patient was educated on appropriate frequency, intensity and duration. Handout provided. 3/29 - Patient instructed to continue gentle HSS and gasroc stretch. HF stretch not appropriate at this time as patient demos functional hip extension ROM and does not feel stretch with such activity. 3/26 - The following exercises were performed and added to the patient's home exercise program. (HSS GENTLE, gastroc S). Additionally, the patient was educated on appropriate frequency, intensity and duration. Handout provided.   3/12 - The following exercises were performed and added to the patient's home exercise program. (SLR abd, sidelying clamshells, adductor isometrics, prone knee flexion [pain free], prone SLR ext [pain free]), Additionally, the patient was educated on appropriate frequency, intensity and duration. Patient heavily edu'd to avoid pain, even slightly with prone knee flexion or prone SLR ext.  3/4 - The following exercises were performed and added to the patient's home exercise program. (SLR abd, SLR ext (in standing), standing TKE, glute sets). Additionally, the patient was educated on appropriate frequency, intensity and duration. Handout provided. 2/26 - The following exercises were performed and added to the patient's home exercise program (arabella pumps, quad sets, abdominal isometrics, prone laying). Additionally, the patient was educated on appropriate frequency, intensity and duration to perform. A detailed handout was provided to the patient. Manual Treatments:   3/12 - prone QUAD stretch; brief assessment of incision and sensitivity near incision - 5'  3/4 - knee flex/ext PROM (full ROM), hip flex to 80deg - 10'  2/26 - consider manual PROM (NO HIP FLEXION WITH KNEE EXTENSION)      Modalities: 2/26 - Consider MOC    Timed Code Treatment Minutes: 54    Total Treatment Minutes:  54    [] EVAL - LOW (35365)   [] EVAL - MOD (57360)  [] EVAL - HIGH (72040)  [] RE-EVAL (31635)  [x] YR(50266) x 3    [] IONTO  [x] NMR (40983) x 1    [] VASO  [] Manual (04236) x      [] Other:  [] TA x       [] Mech Traction (80878)  [] ES(attended) (85270)      [] ES (un) (30920):   [] Aqua (67983) x   [] Group (89813) x   [] Gait training (72028) x     Treatment/Activity Tolerance:  [x] Patient tolerated treatment well [] Patient limited by fatigue  [] Patient limited by pain  [] Patient limited by other medical complications  [x] Other: Pt continues to make progression while following the protocol of Dr. Courtney Bryant. Pt participated in non-impact balance and proprioceptive drills, light hamstring strengthening, as well as functional movements with good control (squats, step-ups, etc).  Pt will continue to progress in accordance to her protocol in order to carry out functional movements w/o pain or compensation. Prognosis: [x] Good [] Fair  [] Poor     Patient Requires Follow-up: [x] Yes  [] No     Plan:   [x] Continue per plan of care [] Alter current plan (see comments)  [] Plan of care initiated [] Hold pending MD visit [] Discharge     Plan for Next Session:  AQUATICS 1X/WEEK 2 WEEKS, DRYLAND 1X/WEEK 2 WEEKS, THEN 2X/WEEK DRYLAND AFTER WBAT     SEE PROXIMAL HAMSTRING REPAIR REHABILITATION OF DR Mejia Bonds [facility] [employer]    Electronically signed by:  Benito Alegria, JENNIFER   Therapist was present, directed the patient's care, made skilled judgement, and was responsible for assessment and treatment of the patient.         Nithin Gardner, CELESTINAT, PT

## 2019-04-09 ENCOUNTER — HOSPITAL ENCOUNTER (OUTPATIENT)
Dept: PHYSICAL THERAPY | Age: 59
Setting detail: THERAPIES SERIES
Discharge: HOME | End: 2019-04-09
Payer: COMMERCIAL

## 2019-04-09 PROCEDURE — 97110 THERAPEUTIC EXERCISES: CPT

## 2019-04-09 PROCEDURE — 97112 NEUROMUSCULAR REEDUCATION: CPT

## 2019-04-09 PROCEDURE — 97530 THERAPEUTIC ACTIVITIES: CPT

## 2019-04-09 NOTE — FLOWSHEET NOTE
Physical Therapy Daily Treatment Note  Date:  2019    Patient Name:  Correne Canavan    :  1960  MRN: [id number]     Restrictions/Precautions:  WBAT BEYOND 6 WEEKS (3/20/19) (WITH WEANING FROM CRUTCHES)  BRACE UNLOCKED FULLY AT THIS TIME; WEAN OUT OF BRACE IF STRENGTH/GAIT IS SUFFICIENT WITHOUT DEVIATION/WEAKNESS/GIVEOUTS/PAIN    Medical/Treatment Diagnosis Information:   · Diagnosis: S76.312D - Complete rupture of (L) proximal hamstring tendon, subsequent - 3 OF 4 HAMSTRINGS SURGICALLY REPAIRED ON 19  · Treatment Diagnosis: difficulty walking, L proximal hamstring repair, decreased strength, decreased flexibility, impaired balance      Tracking Information:  Physician Information Referring Practitioner: Nikhil Garcia MD     Plan of Care Sent Date:  Signed Received: Y   Visit Count / Total Visits      Insurance Approved Visits    Approved Dates:     Insurance Information PT Insurance Information: ANTHEM - 80/20 - 93Z PCY then precert     Progress Note/G-codes   []  Yes  [x]  No Next Due: #10     Pain level: 0/10      Subjective:   Patient will be 9 weeks s/p tomorrow. She is reporting improvement with gait, still needs improvement with endurance and overall strength. Able to sit more easily and without ice. She is sleeping on left side again, \"my favorite side\" she says. Objective:    Observation:     - cues required to avoid trunk rotation w/ qped activity   - patient able to perform step up/over fwd & retro using LLE in pool  3/19 - Pt demos incorrect motor pattern with mini-squats as evidenced by the inability to maintain upright chest position and the knees traveling too far anterior over the toes.   3/22 - patient able to ambulate WBAT in pool    3/12 - incision healing without signs of infection/irritation; small 1cm x 1cm scab formed over middle of incision; patient's skin hypersensitive in area of incision  3/4 - proper form with standing exercises   Test Please help patient schedule FNA biopsy of L thyroid nodule - please ask patient to continue on her Plavix and Xarelto (blood thinners) - but hold them the morning of the biopsy.      Thank you.      measurements:    4/5 - Left Hamstring 90/90 flexibility = 40deg    Exercises:  Exercise/Equipment Resistance/Repetitions Other comments   Seated quad set (approx 50deg knee flex)    Ankle pumps    Abdominal isometric    Prone Laying    CC Standing 3-way SLR into flex, ext, abd R/L 2 x 10 ea:  1.5pl   4/9: CC + resistance   Standing TKE (Touchdown WB on LLE) <<standing at table   Seated Glute Sets    Standing Glute Set << standing at table    Standing L Hip marches (w/ NO knee extension) <<standing at table   Hooklying Adductor Isometric    R sidelying Clamshells    R sidelying SLR abd    Prone SLR Ext << patient heavily edu'd to avoid/stop if even slightly pained   Prone Knee Flexion L <<ankle DF'd to prevent hamstring stretch sensation; patient heavily edu'd to avoid/stop if even slightly pained   Weight Shifting into Alternating Marches    Double leg bridges     Mini-squats w/ emphasis on glute squeeze at top    3/29 - Significant cueing for correct sequencing and technique   Gait Training   3/29 - Verbal and tactile cues to increase lacy, improve arm swing, improve heel strike and push-off with LLE   Standing hamstring curl    // bars          [facility]    Patient Edu    Supine SKTC    Supine Fig 4 Piriformis S    SB Bridges    SB Straight Leg Bridges    SB HS Curls    SB Reverse Crunch    R Bike L3, 4', Bike 2, S7 showing   IB Gastroc 60''    IB HSS 2 x 30'' L    TG Squats 60-70deg - 2 x 10    Qped Glute Kick 2 x 10 R/L    Lateral Heel Taps 6'' R 2 x 10  6'' L 2 x 10    Prone Glute Kick w/ Knee Flexed @ 90 2 x 10    TRX Squats 90deg - 10x    Rebounder Tosses SLS 2 x 10 R/L ea      AquaticTherapy Dates of Service: 3/22, 3/2, 4/2  Aquatic Visits Exercises/Activities: ALL WEIGHT BEARING AS TOLERATED BEGINNING 3/20! Transfers:          % Immersion:            Ambulation:   UE Exercises:       Forwards   2 lap ea Shoulder Shrugs      Lateral    1/2 lap R/L ea Shoulder Circles      Retro    1 lap ea Scapular Activities:    3/29 - Gait training was performed on this date w/o AD in order to increase gait speed, normalize arm swing, and improve heel strike/push-off with the LLE. Tactile and verbal cues were used in order to normalize gait mechanics. Patient demo'd good carryover at end of session. - 12'  3/26 - PT adjusted brace per MD recommendations (hip unlocked fully). Patient educated on exercising continued caution when out of brace. 3/22 - patient educated on WBAT, progression of WB, POC, scheduling aqua/dryland, and precautions at this time. Patient was given tour of pool, instructed on use of lockers and shown PT aquatic equipment. 2/26 - Patient educated on the focus of skilled physical therapy services and plan of care, including: diagnosis, prognosis, treatment goals & options. Patient educated to continue to wear brace at night with sleeping; encouraged patient to continue to try to sit normally. Patient encouraged/educated to maintain touch down weight bearing at this time, and was educated on reducing axillary compression with B crutches. Home Exercise Program:     4/9 - green TB provided for standing SLR 3 way. 4/5 - The following exercises were performed and added to the patient's home exercise program. (SKTC, piriformis S, SB bridges, SB straight leg bridges, SB HS Curls, SB reverse crunch). Additionally, the patient was educated on appropriate frequency, intensity and duration. Handout provided. 3/29 - Patient instructed to continue gentle HSS and gasroc stretch. HF stretch not appropriate at this time as patient demos functional hip extension ROM and does not feel stretch with such activity. 3/26 - The following exercises were performed and added to the patient's home exercise program. (HSS GENTLE, gastroc S). Additionally, the patient was educated on appropriate frequency, intensity and duration. Handout provided.   3/12 - The following exercises were performed and added to the patient's home exercise program. (SLR abd, sidelying clamshells, adductor isometrics, prone knee flexion [pain free], prone SLR ext [pain free]), Additionally, the patient was educated on appropriate frequency, intensity and duration. Patient heavily edu'd to avoid pain, even slightly with prone knee flexion or prone SLR ext.  3/4 - The following exercises were performed and added to the patient's home exercise program. (SLR abd, SLR ext (in standing), standing TKE, glute sets). Additionally, the patient was educated on appropriate frequency, intensity and duration. Handout provided. 2/26 - The following exercises were performed and added to the patient's home exercise program (arabella pumps, quad sets, abdominal isometrics, prone laying). Additionally, the patient was educated on appropriate frequency, intensity and duration to perform. A detailed handout was provided to the patient. Manual Treatments:   3/12 - prone QUAD stretch; brief assessment of incision and sensitivity near incision - 5'  3/4 - knee flex/ext PROM (full ROM), hip flex to 80deg - 10'  2/26 - consider manual PROM (NO HIP FLEXION WITH KNEE EXTENSION)      Modalities: 2/26 - Consider MOC    Timed Code Treatment Minutes: 60    Total Treatment Minutes:  60    [] EVAL - LOW (75470)   [] EVAL - MOD (53195)  [] EVAL - HIGH (95887)  [] RE-EVAL (19158)  [x] FF(26436) x 2    [] IONTO  [x] NMR (62843) x 1    [] VASO  [] Manual (71945) x      [] Other:  [x] TA x 1       [] Mech Traction (93142)  [] ES(attended) (54454)      [] ES (un) (73035):   [] Aqua (52443) x   [] Group (11985) x   [] Gait training (87792) x     Treatment/Activity Tolerance:  [x] Patient tolerated treatment well [] Patient limited by fatigue  [] Patient limited by pain  [] Patient limited by other medical complications  [x] Other: Pt continues to make excellent progression while following the protocol of Dr. Neida Green & recommendations of PT.  The patient demonstrates significantly improved squat

## 2019-04-10 ENCOUNTER — TELEPHONE (OUTPATIENT)
Dept: ORTHOPEDIC SURGERY | Age: 59
End: 2019-04-10

## 2019-04-12 ENCOUNTER — HOSPITAL ENCOUNTER (OUTPATIENT)
Dept: PHYSICAL THERAPY | Age: 59
Setting detail: THERAPIES SERIES
Discharge: HOME | End: 2019-04-12
Payer: COMMERCIAL

## 2019-04-12 PROCEDURE — 97530 THERAPEUTIC ACTIVITIES: CPT

## 2019-04-12 PROCEDURE — 97110 THERAPEUTIC EXERCISES: CPT

## 2019-04-12 NOTE — FLOWSHEET NOTE
Physical Therapy Daily Treatment Note  Date:  2019    Patient Name:  Shirley Whitfield    :  1960  MRN: [id number]     Restrictions/Precautions:  AVOID EXCESSIVE HS STRETCHING OR LOADING HIP JOINT TOO HEAVILY WHEN IN DEEP RANGES OF FLEXION    Medical/Treatment Diagnosis Information:   · Diagnosis: S76.312D - Complete rupture of (L) proximal hamstring tendon, subsequent - 3 OF 4 HAMSTRINGS SURGICALLY REPAIRED ON 19  · Treatment Diagnosis: difficulty walking, L proximal hamstring repair, decreased strength, decreased flexibility, impaired balance      Tracking Information:  Physician Information Referring Practitioner: Charanjit Del Rosario MD     Plan of Care Sent Date:  Signed Received: Y   Visit Count / Total Visits  10/12    Insurance Approved Visits  10/60  Approved Dates:     Insurance Information PT Insurance Information: ANTHEM - 80/20 - 46R PCY then precert     Progress Note/G-codes   [x]  Yes  []  No Next Due: #10     Pain level: 0/10      Subjective:   SEE PN    Objective:  SEE PN  Observation:     - cues required to avoid trunk rotation w/ qped activity   - patient able to perform step up/over fwd & retro using LLE in pool  3/19 - Pt demos incorrect motor pattern with mini-squats as evidenced by the inability to maintain upright chest position and the knees traveling too far anterior over the toes.   3/22 - patient able to ambulate WBAT in pool    3/12 - incision healing without signs of infection/irritation; small 1cm x 1cm scab formed over middle of incision; patient's skin hypersensitive in area of incision  3/4 - proper form with standing exercises   Test measurements:     - Left Hamstring 90/90 flexibility = 40deg    Exercises:  Exercise/Equipment Resistance/Repetitions Other comments   Seated quad set (approx 50deg knee flex)    Ankle pumps    Abdominal isometric    Prone Laying    CC Standing 3-way SLR into flex, ext, abd   : CC + resistance   Standing TKE (Touchdown WB on LLE) <<standing at table   Seated Glute Sets    Standing Glute Set << standing at table    Standing L Hip marches (w/ NO knee extension) <<standing at table   Hooklying Adductor Isometric    R sidelying Clamshells    R sidelying SLR abd    Prone SLR Ext << patient heavily edu'd to avoid/stop if even slightly pained   Prone Knee Flexion L <<ankle DF'd to prevent hamstring stretch sensation; patient heavily edu'd to avoid/stop if even slightly pained   Weight Shifting into Alternating Marches    Double leg bridges    Mini-squats w/ emphasis on glute squeeze at top    3/29 - Significant cueing for correct sequencing and technique   Gait Training   3/29 - Verbal and tactile cues to increase lacy, improve arm swing, improve heel strike and push-off with LLE   Standing hamstring curl    // bars          [facility]    Patient Edu    Supine SKTC    Supine Fig 4 Piriformis S    SB Bridges    SB Straight Leg Bridges    SB HS Curls    SB Reverse Crunch    R Bike    IB Gastroc    IB HSS    TG Squats    Qped Glute Kick    Lateral Heel Taps    Prone Glute Kick w/ Knee Flexed @ 90    TRX Squats    Rebounder Tosses       Other Activities:    4/12 - The patient was provided an assessment including evaluative & functional tests and measures, as well as documentation to track progress. Extensive discussion with patient regarding return to work guidelines, and response needed from [employer] regarding disability benefits claim. Patient and PT discussion regarding HEP performance, as well as update of this program (please see below). - 60'  3/29 - Gait training was performed on this date w/o AD in order to increase gait speed, normalize arm swing, and improve heel strike/push-off with the LLE. Tactile and verbal cues were used in order to normalize gait mechanics. Patient demo'd good carryover at end of session. - 12'  3/26 - PT adjusted brace per MD recommendations (hip unlocked fully).  Patient educated on exercising continued caution when out of brace. 3/22 - patient educated on WBAT, progression of WB, POC, scheduling aqua/dryland, and precautions at this time. Patient was given tour of pool, instructed on use of lockers and shown PT aquatic equipment. 2/26 - Patient educated on the focus of skilled physical therapy services and plan of care, including: diagnosis, prognosis, treatment goals & options. Patient educated to continue to wear brace at night with sleeping; encouraged patient to continue to try to sit normally. Patient encouraged/educated to maintain touch down weight bearing at this time, and was educated on reducing axillary compression with B crutches. Home Exercise Program:     4/12 - The following exercises were discussed and added to the patient's home exercise program. (supine SLR flex, qped glute kicks, qped LE extension, standing HF stretch, qped hip abd+ER). Additionally, the patient was educated on appropriate frequency, intensity and duration. Handout provided. Patient's previous HEP sheets were edited and highlighted to remove more simple exercises (from 2/26 and portions of 3/4), and to progress resistive exercises. Patient's questions were answered. (time included in 'other activities' above). 4/9 - green TB provided for standing SLR 3 way. 4/5 - The following exercises were performed and added to the patient's home exercise program. (SKTC, piriformis S, SB bridges, SB straight leg bridges, SB HS Curls, SB reverse crunch). Additionally, the patient was educated on appropriate frequency, intensity and duration. Handout provided. 3/29 - Patient instructed to continue gentle HSS and gasroc stretch. HF stretch not appropriate at this time as patient demos functional hip extension ROM and does not feel stretch with such activity. 3/26 - The following exercises were performed and added to the patient's home exercise program. (HSS GENTLE, gastroc S).  Additionally, the patient was educated on appropriate Good [] Fair  [] Poor     Patient Requires Follow-up: [x] Yes  [] No     Plan:   [x] Continue per plan of care [] Alter current plan (see comments)  [] Plan of care initiated [] Hold pending MD visit [] Discharge     Plan for Next Session: PROGRESS CKC ACTIVITY, HAMSTRING FLEXIBILITY, LE STABILITY & STRENGTH  SEE PROXIMAL HAMSTRING REPAIR REHABILITATION OF DR Rickey Vernon [facility]    Electronically signed by:  Lee Sullivan, SPT  Therapist was present, directed the patient's care, made skilled judgement, and was responsible for assessment and treatment of the patient.         Thuy Bishop, DPT, PT

## 2019-04-12 NOTE — PROGRESS NOTES
starting PT. She reports reciprocal ambulation up stairs, but not down stairs yet. She reports she cannot wash left foot without compensations, or more simply, has great difficulty bend down to dress/wash/lotion left foot as well as donning/doffing socks/shoes. She cannot stand and bend forward to touch her toes. She can sit about 1 hour before she needs to get up and move. She is now out of her T-scope brace at all times and is no longer using an AD. There is still slight hypersensitivity of adductor tissues. She has been wearing soft/light materials to avoid aggravation of this area. She reports improvement with standing, walking, squatting tolerance - but reports these areas could still use significant improvement. Her pain is largely under control, and she may very infrequently use OTC medication to alleviate pain. She is extremely compliant with PT HEP and performs her routine up to 3 times a day. The patient plans to return to work 4/22/19, and thinks she will be able to perform her normal job duties as long as she is able to get up from her desk and walk around every hour in order to alleviate discomfort and limited tolerance to prolonged sitting.   Pain Screening  Patient Currently in Pain: No  Pain Assessment  Pain Assessment: 0-10      Objective:         Observation/Palpation  Posture: Fair  Palpation: +2 TTP ischial tuberosity (L), +2 (L) adductor insertion group  Observation: patient ambulates without AD or brace; decreased hamstring flexibility observed in most functional movements or LE activity involving hip flexion and knee extension  Scar: healing, maturing scar on L ischial tuberosity  PROM RLE (degrees)  RLE PROM: WFL  AROM LLE (degrees)  L Hip Flexion 0-125: Observed approx 90deg flex  L Hip Extension 0-10: 8(PRONE)  L Hip ABduction 0-45: 45(supine)  L Hip External Rotation 0-45: 33(PRONE)  L Hip Internal Rotation 0-45: 26(PRONE)  L Knee Flexion 0-145: 130  L Knee Extension 0: 0  AROM RLE (degrees)  RLE AROM: WFL  Strength LLE  L Hip Flexion: 4/5(seated modification due to HS repair)  L Hip Extension: 3+/5  L Hip ABduction: 4-/5  L Hip Internal Rotation: 4/5  L Hip External Rotation: 4/5  L Knee Flexion: 3/5  L Knee Extension: 4/5(discomfort in HS w/ full knee ext test position)  Tone RLE  RLE Tone: Normotonic  Tone LLE  LLE Tone: Normotonic  Motor Control  Gross Motor?: Exceptions  Comments: LLE lacks eccentric quad/hamstring/gluteal control at this time  Additional Measures  Flexibility: HS 90/90 L: 74, L quad (heel to ischial tub - in prone): 12cm  Special Tests: 30sec SitToStand = 8 repetitions  Sensation  Overall Sensation Status: Impaired(hypersensitivity adductor group)  Bed Mobility  Bridging: Independent  Scooting: Independent  Transfers  Sit to Stand: Independent  Stand to sit: Modified independent(hands to control descent)  Bed to Chair: Independent  Ambulation  Ambulation?: Yes  Ambulation 1  Surface: level tile  Device: No Device  Assistance: Independent  Quality of Gait: decreased L hip extension in terminal stance, trendelenburg gait noted in LLE in midstance (increased with fatigue)  Distance: >150ft in clinic  Stairs/Curb  Stairs?: Yes  Stairs  Stairs Height: 6\"  Rails: None  Device: No Device  Assistance: Modified independent   Comment: reciprocal up, nonreciprocal down; compensated trendelenburg L when advancing RLE up  Balance  Posture: Fair  Sitting - Static: Good  Sitting - Dynamic: Good  Standing - Static: Good  Standing - Dynamic: Fair, +  Comments: Narrow DONTAE = 10 sec, Semi-tandem (L) = 10 sec, Tandem (L) = 10 sec, SLS (L) = 10 sec       Functional Outcome Measures:  LEFS Total Score: 25     Assessment:  Conditions Requiring Skilled Therapeutic Intervention  Body structures, Functions, Activity limitations: Decreased functional mobility , Increased Pain, Decreased balance, Decreased ADL status, Decreased ROM, Decreased strength, Decreased high-level IADLs, Decreased fine motor control, Decreased coordination, Decreased endurance  Assessment: The patient is a 63year old female who presents 9 weeks s/p left proximal hamstring tendon repair. She has made considerable improvement in hip/knee ROM, gait, strength, flexibility, muscle activation, coordination of LLE, endurance and balance since starting PT services. However, she continues to demonstrate limitations in hip strength, hamstring flexibility, activity tolerance (mainly sitting, walking, stairs, squatting, CKC activity) that warrants continuation of PT services. Her severity of surgical repair and expected healing timeframes indicates her case may take longer to fully rehabilitate; but, the patient's compliance with HEP and PT POC has significantly facilitated her improvement in just 10 visits. PT recommends continuation of therapy services due to present limitations of muscle performance, recommended surgeon protocol, and current functional status. Per MD recommendations, the patient should expect to return to work on 4/22/19 and perform her job duties safely; however, PT recommends that the patient be allowed to stand/walk every hour as needed from her desk to alleviate pressure on healing hamstring tissues. The patient will follow up with MD on May 30th, 2019.   Treatment Diagnosis: difficulty walking, L proximal hamstring repair, decreased strength, decreased flexibility, decreased activity tolerance  Prognosis: Excellent  Barriers to Learning: n/a  REQUIRES PT FOLLOW UP: Yes    Plan:   Plan  Times per week: 2  Plan weeks: 6  Current Treatment Recommendations: Strengthening, IADL Training, Neuromuscular Re-education, Home Exercise Program, ROM, Manual Therapy - Soft Tissue Mobilization, Safety Education & Training, Balance Training, Endurance Training, Patient/Caregiver Education & Training, Functional Mobility Training, Manual Therapy - Joint Manipulation, Transfer Training, Gait Training, Modalities, ADL/Self-care Training, Stair training, Pain Management, Positioning, Equipment Evaluation, Education, & procurement, Aquatics          Progress towards goals:    Short term goals  Time Frame for Short term goals: 12 weeks s/p  Short term goal 1: patient will step up/down w/ LLE 10x on 6'' step with good control and no pain - GOAL MET - New Goal - Patient will increase 30s STS score by at least 5 repetitions in order to increase BLE strength and transfer abilities. Short term goal 2: patient will squat >60deg 10x with good control and no pain - NOT MET, PROGRESSING  Short term goal 3: patient will perform 10x partial lunge <60 deg knee flexion w/ good control and no pain - NOT MET, PROGRESSING  Long term goals  Time Frame for Long term goals : 6 months post op  Long term goal 1: The patient will ambulate up/down 27 stairs w/o use of handrail w/ good control and no pain - NOT MET, PROGRESSING  Long term goal 2: Patient will demonstrate 5/5 strength throughout L hip extensors, abductors, hamstrings and quadriceps - NOT MET, PROGRESSING  Long term goal 3: Patient will demonstrate 60:40 peak torque quad to hamstring on biodex isokinetic testing - NOT MET. PROGRESSING    Current Frequency/Duration:  # Days per week: ? 1 day # Weeks: ? 1 week ? 4 weeks      X 2 days   ? 2 weeks ? 5 weeks      ? 3 days   ? 3 weeks X 6 weeks     Rehab Potential: X Excellent ? Good ? Fair  ? Poor     Goal Status:  ? Achieved ? Partially Achieved  X Not Achieved, PROGRESSING     Patient Status: X Continue per initial plan of Care (2v remain)     ? Patient now discharged     X Additional visits requested, Please re-certify for additional visits:      Requested frequency/duration:  2X/week for 6 weeks    Electronically signed by: JENNIFER Sherman  Therapist was present, directed the patient's care, made skilled judgement, and was responsible for assessment and treatment of the patient.          Kasey Cervantes, PT, DPT    Thank you for this kind and

## 2019-04-16 ENCOUNTER — HOSPITAL ENCOUNTER (OUTPATIENT)
Dept: PHYSICAL THERAPY | Age: 59
Setting detail: THERAPIES SERIES
Discharge: HOME | End: 2019-04-16
Payer: COMMERCIAL

## 2019-04-16 PROCEDURE — 97530 THERAPEUTIC ACTIVITIES: CPT

## 2019-04-16 PROCEDURE — 97112 NEUROMUSCULAR REEDUCATION: CPT

## 2019-04-16 PROCEDURE — 97110 THERAPEUTIC EXERCISES: CPT

## 2019-04-16 NOTE — FLOWSHEET NOTE
Physical Therapy Daily Treatment Note  Date:  2019    Patient Name:  Laurey Gottron    :  1960  MRN: [id number]     Restrictions/Precautions:  AVOID EXCESSIVE HS STRETCHING OR LOADING HIP JOINT TOO HEAVILY WHEN IN DEEP RANGES OF FLEXION    Medical/Treatment Diagnosis Information:   · Diagnosis: S76.312D - Complete rupture of (L) proximal hamstring tendon, subsequent - 3 OF 4 HAMSTRINGS SURGICALLY REPAIRED ON 19  · Treatment Diagnosis: difficulty walking, L proximal hamstring repair, decreased strength, decreased flexibility, impaired balance      Tracking Information:  Physician Information Referring Practitioner: Carmen Baum MD     Plan of Care Sent Date:  Signed Received: Y   Visit Count / Total Visits      Insurance Approved Visits    Approved Dates:     Insurance Information PT Insurance Information: [address] precert     Progress Note/G-codes   [x]  Yes  []  No Next Due: #10     Pain level: 0/10      Subjective: The patient reports the new exercises of her HEP are helping, but hip extension and extension with knee bent is most fatiguing. The patient report she still feels a \"hitch\" in her walk at times. Stiffness after sitting is one of the biggest complaints at this time. Objective:    Observation:      - cues required to avoid trunk rotation w/ qped activity   - patient able to perform step up/over fwd & retro using LLE in pool  3/19 - Pt demos incorrect motor pattern with mini-squats as evidenced by the inability to maintain upright chest position and the knees traveling too far anterior over the toes.   3/22 - patient able to ambulate WBAT in pool    3/12 - incision healing without signs of infection/irritation; small 1cm x 1cm scab formed over middle of incision; patient's skin hypersensitive in area of incision  3/4 - proper form with standing exercises   Test measurements:     - Left Hamstring 90/90 flexibility = 40deg    Exercises:  Exercise/Equipment Resistance/Repetitions Other comments   Seated quad set (approx 50deg knee flex)    Ankle pumps    Abdominal isometric    Prone Laying    CC Standing 3-way SLR into flex, ext, abd   4/9: CC + resistance   Standing TKE (Touchdown WB on LLE) <<standing at table   Seated Glute Sets    Standing Glute Set << standing at table    Standing L Hip marches (w/ NO knee extension) <<standing at table   Hooklying Adductor Isometric    R sidelying Clamshells    R sidelying SLR abd    Prone SLR Ext << patient heavily edu'd to avoid/stop if even slightly pained   Prone Knee Flexion L <<ankle DF'd to prevent hamstring stretch sensation; patient heavily edu'd to avoid/stop if even slightly pained   Weight Shifting into Alternating Marches    Double leg bridges    Mini-squats w/ emphasis on glute squeeze at top    3/29 - Significant cueing for correct sequencing and technique   Gait Training   3/29 - Verbal and tactile cues to increase lacy, improve arm swing, improve heel strike and push-off with LLE   Standing hamstring curl    // bars       L SLS on airex  2 x 20''    L SLS on airex w/ head turns 3x (stopped due to diff)    L SLS on floor w/ head turns 3x (capable)      Guy Medico    Patient Edu    Supine SKTC    Supine Fig 4 Piriformis S    SB Bridges    SB Straight Leg Bridges    SB HS Curls    SB Reverse Crunch    R Bike    IB Gastroc    IB HSS    TG SLR Abd 2 x 10 R/L ea   TG Squats + Hip Abd TB Loop 2 x 10 - orange (small loop)   TG Squats 80deg - 2 x 10   Qped Glute Kick    Lateral Heel Taps 6'' R 2 x 15  4'' L 2 x 15  6'' L 2 x 10   Prone Glute Kick w/ Knee Flexed @ 90    Rockerboard M/L - taps 10x  M/L - balance 2 x 20''       TRX 1/4 Lunges 8x L in front (caution!)   TRX Squat + Heel Crush & DF 80deg - 10x   TRX Squats 90deg - 10x   Rebounder Tosses     TM Mush 2' R/L ea 0. 7mph    TM Amb 1% grade 1. 7mph - 2'    Upright Bike Attempted, stopped due to seat discomfort    Standing Quad Stretch 3 x 20'' L    Stair Ambulation 13 steps up/down - 1x (w/o handrail)           Other Activities:    4/12 - The patient was provided an assessment including evaluative & functional tests and measures, as well as documentation to track progress. Extensive discussion with patient regarding return to work guidelines, and response needed from [employer] regarding disability benefits claim. Patient and PT discussion regarding HEP performance, as well as update of this program (please see below). - 60'  3/29 - Gait training was performed on this date w/o AD in order to increase gait speed, normalize arm swing, and improve heel strike/push-off with the LLE. Tactile and verbal cues were used in order to normalize gait mechanics. Patient demo'd good carryover at end of session. - 12'  3/26 - PT adjusted brace per MD recommendations (hip unlocked fully). Patient educated on exercising continued caution when out of brace. 3/22 - patient educated on WBAT, progression of WB, POC, scheduling aqua/dryland, and precautions at this time. Patient was given tour of pool, instructed on use of lockers and shown PT aquatic equipment. 2/26 - Patient educated on the focus of skilled physical therapy services and plan of care, including: diagnosis, prognosis, treatment goals & options. Patient educated to continue to wear brace at night with sleeping; encouraged patient to continue to try to sit normally. Patient encouraged/educated to maintain touch down weight bearing at this time, and was educated on reducing axillary compression with B crutches. Home Exercise Program:     4/16 - verbally discussed and recommended standing quad stretch after proper performance in clinic. 4/12 - The following exercises were discussed and added to the patient's home exercise program. (supine SLR flex, qped glute kicks, qped LE extension, standing HF stretch, qped hip abd+ER).  Additionally, the patient was educated on appropriate frequency, intensity and duration. Handout provided. Patient's previous HEP sheets were edited and highlighted to remove more simple exercises (from 2/26 and portions of 3/4), and to progress resistive exercises. Patient's questions were answered. (time included in 'other activities' above). 4/9 - green TB provided for standing SLR 3 way. 4/5 - The following exercises were performed and added to the patient's home exercise program. (SKTC, piriformis S, SB bridges, SB straight leg bridges, SB HS Curls, SB reverse crunch). Additionally, the patient was educated on appropriate frequency, intensity and duration. Handout provided. 3/29 - Patient instructed to continue gentle HSS and gasroc stretch. HF stretch not appropriate at this time as patient demos functional hip extension ROM and does not feel stretch with such activity. 3/26 - The following exercises were performed and added to the patient's home exercise program. (HSS GENTLE, gastroc S). Additionally, the patient was educated on appropriate frequency, intensity and duration. Handout provided. 3/12 - The following exercises were performed and added to the patient's home exercise program. (SLR abd, sidelying clamshells, adductor isometrics, prone knee flexion [pain free], prone SLR ext [pain free]), Additionally, the patient was educated on appropriate frequency, intensity and duration. Patient heavily edu'd to avoid pain, even slightly with prone knee flexion or prone SLR ext.  3/4 - The following exercises were performed and added to the patient's home exercise program. (SLR abd, SLR ext (in standing), standing TKE, glute sets). Additionally, the patient was educated on appropriate frequency, intensity and duration. Handout provided. 2/26 - The following exercises were performed and added to the patient's home exercise program (arabella pumps, quad sets, abdominal isometrics, prone laying).  Additionally, the patient was educated on appropriate frequency, intensity and duration to perform. A detailed handout was provided to the patient. Manual Treatments:   3/12 - prone QUAD stretch; brief assessment of incision and sensitivity near incision - 5'  3/4 - knee flex/ext PROM (full ROM), hip flex to 80deg - 10'  2/26 - consider manual PROM (NO HIP FLEXION WITH KNEE EXTENSION)       Modalities: 2/26 - Consider MOC      Timed Code Treatment Minutes: 65    Total Treatment Minutes:  65    [] EVAL - LOW (88753)   [] EVAL - MOD (07112)  [] EVAL - HIGH (87407)  [] RE-EVAL (20228)  [x] DU(16320) x 2    [] IONTO  [x] NMR (11576) x 1     [] VASO  [] Manual (77634) x      [] Other:  [x] TA x 1      [] Mech Traction (04736)  [] ES(attended) (74319)      [] ES (un) (66717):   [] Aqua (35105) x   [] Group (18939) x   [] Gait training (05049) x     Treatment/Activity Tolerance:  [x] Patient tolerated treatment well [] Patient limited by fatigue  [] Patient limited by pain  [] Patient limited by other medical complications  [x] Other: The patient demos difficulty with eccentric quad control on this date, however, after cues and continued exercise - the patient was able to perform lateral heel taps with improved control and form.  The patient is progressing well at this time; deficits with stairs, walking, lunging persist.      Prognosis: [x] Good [] Fair  [] Poor     Patient Requires Follow-up: [x] Yes  [] No     Plan:   [x] Continue per plan of care [] Alter current plan (see comments)  [] Plan of care initiated [] Hold pending MD visit [] Discharge     Plan for Next Session: PROGRESS CK[facility] ACTIVITY, HAMSTRING FLEXIBILITY, LE STABILITY & STRENGTH  SEE PROXIMAL HAMSTRING REPAIR REHABILITATION OF DR Chikis Estes IN [address]    Electronically signed by:  Lise Medina, PT, DPT

## 2019-04-19 ENCOUNTER — HOSPITAL ENCOUNTER (OUTPATIENT)
Dept: PHYSICAL THERAPY | Age: 59
Setting detail: THERAPIES SERIES
Discharge: HOME | End: 2019-04-19
Payer: COMMERCIAL

## 2019-04-19 NOTE — PROGRESS NOTES
Physical Therapy  Cancellation/No-show Note  Patient Name:  Maren Cornelius  :  1960   Date:  2019  Cancelled visits to date: 1  No-shows to date: 0    Patient status for today's appointment patient:  [x]  Cancelled   []  Rescheduled appointment  []  No-show     Reason given by patient:  []  Patient ill  [x]  Conflicting appointment  []  No transportation    []  Conflict with work  []  No reason given  []  Other:     Comments:      Phone call information:   []  Phone call made today to patient at _ time at number provided:      []  Patient answered, conversation as follows:    []  Patient did not answer, message left as follows:  [x]  Phone call not made today    Electronically signed by:  Saman Pinto PT

## 2019-04-22 ENCOUNTER — HOSPITAL ENCOUNTER (OUTPATIENT)
Dept: PHYSICAL THERAPY | Age: 59
Setting detail: THERAPIES SERIES
Discharge: HOME | End: 2019-04-22
Payer: COMMERCIAL

## 2019-04-22 PROCEDURE — 97110 THERAPEUTIC EXERCISES: CPT

## 2019-04-22 PROCEDURE — 97112 NEUROMUSCULAR REEDUCATION: CPT

## 2019-04-22 NOTE — FLOWSHEET NOTE
Physical Therapy Daily Treatment Note  Date:  2019    Patient Name:  Dai Ramirez    :  1960  MRN: [id number]     Restrictions/Precautions:  AVOID EXCESSIVE HS STRETCHING OR LOADING HIP JOINT TOO HEAVILY WHEN IN DEEP RANGES OF FLEXION    Medical/Treatment Diagnosis Information:   · Diagnosis: S76.312D - Complete rupture of (L) proximal hamstring tendon, subsequent - 3 OF 4 HAMSTRINGS SURGICALLY REPAIRED ON 19  · Treatment Diagnosis: difficulty walking, L proximal hamstring repair, decreased strength, decreased flexibility, impaired balance      Tracking Information:  Physician Information Referring Practitioner: Isabella Vidal MD     Plan of Care Sent Date:  Signed Received: Y   Visit Count / Total Visits   + 0    Insurance Approved Visits    Approved Dates:     Insurance Information PT Insurance Information: [address] precert     Progress Note/G-codes   []  Yes  [x]  No Next Due: #20     Pain level: 0/10      Subjective:   - Pt states that today was her first day back to work. She tolerated the work day well overall, but states that she experienced a little discomfort at the end of the work day after sitting for too long. She was able to stand a walk around as needed to alleviate discomfort. Objective:    Observation:      - cues required to avoid trunk rotation w/ qped activity   - patient able to perform step up/over fwd & retro using LLE in pool  3/19 - Pt demos incorrect motor pattern with mini-squats as evidenced by the inability to maintain upright chest position and the knees traveling too far anterior over the toes.   3/22 - patient able to ambulate WBAT in pool    3/12 - incision healing without signs of infection/irritation; small 1cm x 1cm scab formed over middle of incision; patient's skin hypersensitive in area of incision  3/4 - proper form with standing exercises   Test measurements:     - Left Hamstring 90/90 flexibility = 40deg    Exercises:  Exercise/Equipment Resistance/Repetitions Other comments   Seated quad set (approx 50deg knee flex)    Ankle pumps    Abdominal isometric    Prone Laying    CC Standing 3-way SLR into flex, ext, abd   4/9: CC + resistance   Standing TKE (Touchdown WB on LLE) <<standing at table   Seated Glute Sets    Standing Glute Set << standing at table    Standing L Hip marches (w/ NO knee extension) <<standing at table   Hooklying Adductor Isometric    R sidelying Clamshells    R sidelying SLR abd    Prone SLR Ext << patient heavily edu'd to avoid/stop if even slightly pained   Prone Knee Flexion L <<ankle DF'd to prevent hamstring stretch sensation; patient heavily edu'd to avoid/stop if even slightly pained   Weight Shifting into Alternating Marches    Double leg bridges    Mini-squats w/ emphasis on glute squeeze at top    3/29 - Significant cueing for correct sequencing and technique   Gait Training   3/29 - Verbal and tactile cues to increase lacy, improve arm swing, improve heel strike and push-off with LLE   Standing hamstring curl    // bars            Tobin Height    Patient Edu    Supine SKTC    Supine Fig 4 Piriformis S    SB Bridges    SB Straight Leg Bridges    SB HS Curls    SB Reverse Crunch    R Bike    IB Gastroc    IB HSS 2 x 30'' L  Pre- and post-session   TG SLR Abd    TG Squats + Hip Abd TB Loop    TG Squats    Qped Birddog 2 x 5 R/L4/22 - Cues used in order to decrease pelvic rotation   Lateral Heel Taps 6'' 2 x 10 R/L ea4/22 - Earthquake bar in hand for support   Prone Glute Kick w/ Knee Flexed @ 90 3 x 10   Rockerboard        TRX 1/4 Lunges 10x L in front (caution!)   TRX Squat + Heel Crush & DF    TRX Squats 90deg - 2x10     Rebounder Tosses     TM Mush     TM Amb  Warm-Up 1% grade 2. 5mph - 5'    Upright Bike     Standing Quad Stretch     Lateral band walks 2 x 15''  R/L ea    Stair Ambulation  (w/o handrail)    3-point excursion 2 x 5 L only  1 repetition = reaching forward, reaching 45 deg ipsilateral, reaching 45 deg contralateral    Sport Cord Resisted walking: Forward 20'x3  Backward 20'x3  Lateral (R) 20'x3  Lateral (L) 20'x3       4'' Forward/RetroStep Overs    6' Forward/Retro Step Overs x10 L only        x10 L only      Other Activities:    4/12 - The patient was provided an assessment including evaluative & functional tests and measures, as well as documentation to track progress. Extensive discussion with patient regarding return to work guidelines, and response needed from [employer] regarding disability benefits claim. Patient and PT discussion regarding HEP performance, as well as update of this program (please see below). - 60'  3/29 - Gait training was performed on this date w/o AD in order to increase gait speed, normalize arm swing, and improve heel strike/push-off with the LLE. Tactile and verbal cues were used in order to normalize gait mechanics. Patient demo'd good carryover at end of session. - 12'  3/26 - PT adjusted brace per MD recommendations (hip unlocked fully). Patient educated on exercising continued caution when out of brace. 3/22 - patient educated on WBAT, progression of WB, POC, scheduling aqua/dryland, and precautions at this time. Patient was given tour of pool, instructed on use of lockers and shown PT aquatic equipment. 2/26 - Patient educated on the focus of skilled physical therapy services and plan of care, including: diagnosis, prognosis, treatment goals & options. Patient educated to continue to wear brace at night with sleeping; encouraged patient to continue to try to sit normally. Patient encouraged/educated to maintain touch down weight bearing at this time, and was educated on reducing axillary compression with B crutches. Home Exercise Program:     4/16 - verbally discussed and recommended standing quad stretch after proper performance in clinic.   4/12 - The following exercises were discussed and added to the patient's home exercise program. (supine SLR flex, qped glute kicks, qped LE extension, standing HF stretch, qped hip abd+ER). Additionally, the patient was educated on appropriate frequency, intensity and duration. Handout provided. Patient's previous HEP sheets were edited and highlighted to remove more simple exercises (from 2/26 and portions of 3/4), and to progress resistive exercises. Patient's questions were answered. (time included in 'other activities' above). 4/9 - green TB provided for standing SLR 3 way. 4/5 - The following exercises were performed and added to the patient's home exercise program. (SKTC, piriformis S, SB bridges, SB straight leg bridges, SB HS Curls, SB reverse crunch). Additionally, the patient was educated on appropriate frequency, intensity and duration. Handout provided. 3/29 - Patient instructed to continue gentle HSS and gasroc stretch. HF stretch not appropriate at this time as patient demos functional hip extension ROM and does not feel stretch with such activity. 3/26 - The following exercises were performed and added to the patient's home exercise program. (HSS GENTLE, gastroc S). Additionally, the patient was educated on appropriate frequency, intensity and duration. Handout provided. 3/12 - The following exercises were performed and added to the patient's home exercise program. (SLR abd, sidelying clamshells, adductor isometrics, prone knee flexion [pain free], prone SLR ext [pain free]), Additionally, the patient was educated on appropriate frequency, intensity and duration. Patient heavily edu'd to avoid pain, even slightly with prone knee flexion or prone SLR ext.  3/4 - The following exercises were performed and added to the patient's home exercise program. (SLR abd, SLR ext (in standing), standing TKE, glute sets). Additionally, the patient was educated on appropriate frequency, intensity and duration. Handout provided.   2/26 - The following exercises were performed and added to the patient's home exercise program (arabella pumps, quad sets, abdominal isometrics, prone laying). Additionally, the patient was educated on appropriate frequency, intensity and duration to perform. A detailed handout was provided to the patient. Manual Treatments:   3/12 - prone QUAD stretch; brief assessment of incision and sensitivity near incision - 5'  3/4 - knee flex/ext PROM (full ROM), hip flex to 80deg - 10'  2/26 - consider manual PROM (NO HIP FLEXION WITH KNEE EXTENSION)       Modalities: 2/26 - Consider MOC      Timed Code Treatment Minutes: 62    Total Treatment Minutes:  62    [] EVAL - LOW (33152)   [] EVAL - MOD (53329)  [] EVAL - HIGH (51510)  [] RE-EVAL (02786)  [x] OL(53793) x 3    [] IONTO  [x] NMR (76295) x 1     [] VASO  [] Manual (44383) x      [] Other:  [] TA x 1      [] Mech Traction (40433)  [] ES(attended) (88071)      [] ES (un) (44004):   [] Aqua (69304) x   [] Group (40023) x   [] Gait training (21135) x      Treatment/Activity Tolerance:  [x] Patient tolerated treatment well [] Patient limited by fatigue  [] Patient limited by pain  [] Patient limited by other medical complications  [x] Other: The patient was able to progress several exercises on this date but still has room to improve in regards to eccentric L quad control. Pt was somewhat limited during certain activities secondary to L anterior knee pain. Occasionally, exercises needed to be adjusted in order to decrease loading of the L knee and allow the patient to participate in strengthening activities for the proximal hip.      Prognosis: [x] Good [] Fair  [] Poor     Patient Requires Follow-up: [x] Yes  [] No     Plan:   [x] Continue per plan of care [] Alter current plan (see comments)  [] Plan of care initiated [] Hold pending MD visit [] Discharge     Plan for Next Session: PROGRESS CKC ACTIVITY, HAMSTRING FLEXIBILITY, LE STABILITY & STRENGTH  SEE PROXIMAL HAMSTRING REPAIR REHABILITATION OF DR Jez BARBOZA IN

## 2019-04-24 ENCOUNTER — HOSPITAL ENCOUNTER (OUTPATIENT)
Dept: PHYSICAL THERAPY | Age: 59
Setting detail: THERAPIES SERIES
Discharge: HOME | End: 2019-04-24
Payer: COMMERCIAL

## 2019-04-24 PROCEDURE — 97530 THERAPEUTIC ACTIVITIES: CPT

## 2019-04-24 PROCEDURE — 97110 THERAPEUTIC EXERCISES: CPT

## 2019-04-24 PROCEDURE — 97112 NEUROMUSCULAR REEDUCATION: CPT

## 2019-04-24 NOTE — FLOWSHEET NOTE
Left Hamstring 90/90 flexibility = 40deg    Exercises:  Exercise/Equipment Resistance/Repetitions Other comments   Seated quad set (approx 50deg knee flex)    Ankle pumps    Abdominal isometric    Prone Laying    CC Standing 3-way SLR into flex, ext, abd   4/9: CC + resistance   Standing TKE (Touchdown WB on LLE) <<standing at table   Seated Glute Sets    Standing Glute Set << standing at table    Standing L Hip marches (w/ NO knee extension) <<standing at table   Hooklying Adductor Isometric    R sidelying Clamshells    R sidelying SLR abd    Prone SLR Ext << patient heavily edu'd to avoid/stop if even slightly pained   Prone Knee Flexion L <<ankle DF'd to prevent hamstring stretch sensation; patient heavily edu'd to avoid/stop if even slightly pained   Weight Shifting into Alternating Marches    Double leg bridges    Mini-squats w/ emphasis on glute squeeze at top    3/29 - Significant cueing for correct sequencing and technique   Gait Training   3/29 - Verbal and tactile cues to increase lacy, improve arm swing, improve heel strike and push-off with LLE   Standing hamstring curl    // bars            David Goff    Patient Edu    Supine SKTC    Supine Fig 4 Piriformis S    SB Bridges    SB Straight Leg Bridges    SB HS Curls    SB Reverse Crunch    R Bike    IB Gastroc    IB HSS 2 x 30'' L  Pre- and post-session   TG SLR Abd    TG Squats + Hip Abd TB Loop    TG Squats    Qped Birddog 2 x 5 R/L4/22 - Cues used in order to decrease pelvic rotation   Lateral Heel Taps 6'' 2 x 10 R/L ea4/24 - Improved eccentric control on this date, 1 handheld support   Prone Glute Kick w/ Knee Flexed @ 90    Rockerboard        TRX 1/4 Lunges 2x10 L in front (caution!)     TRX Squat + Heel Crush & DF    TRX Squats 90deg - 2x15     Rebounder Tosses     TM Mush     TM Amb  Warm-Up     Upright Bike     Standing Quad Stretch     Lateral band walks 4 x 20'  R/L ea    Stair Ambulation  (w/o handrail)    3-point excursion     Sport Cord Cybex Leg Press    Quantum Knee Ext    Quantum Knee Curl 30# 3x15 DL      20# 3x10 DL      10# 1 x 10 DL  20# 2x10 DL Added 4/24            (Pt begins exercise in slight knee flexed position)   6'' Forward/RetroStep Overs    8'' Forward/Retro Step Overs x10 L only        x10 L only      Other Activities:    4/12 - The patient was provided an assessment including evaluative & functional tests and measures, as well as documentation to track progress. Extensive discussion with patient regarding return to work guidelines, and response needed from [employer] regarding disability benefits claim. Patient and PT discussion regarding HEP performance, as well as update of this program (please see below). - 60'  3/29 - Gait training was performed on this date w/o AD in order to increase gait speed, normalize arm swing, and improve heel strike/push-off with the LLE. Tactile and verbal cues were used in order to normalize gait mechanics. Patient demo'd good carryover at end of session. - 12'  3/26 - PT adjusted brace per MD recommendations (hip unlocked fully). Patient educated on exercising continued caution when out of brace. 3/22 - patient educated on WBAT, progression of WB, POC, scheduling aqua/dryland, and precautions at this time. Patient was given tour of pool, instructed on use of lockers and shown PT aquatic equipment. 2/26 - Patient educated on the focus of skilled physical therapy services and plan of care, including: diagnosis, prognosis, treatment goals & options. Patient educated to continue to wear brace at night with sleeping; encouraged patient to continue to try to sit normally. Patient encouraged/educated to maintain touch down weight bearing at this time, and was educated on reducing axillary compression with B crutches. Home Exercise Program:     4/16 - verbally discussed and recommended standing quad stretch after proper performance in clinic.   4/12 - The following exercises were discussed and added to the patient's home exercise program. (supine SLR flex, qped glute kicks, qped LE extension, standing HF stretch, qped hip abd+ER). Additionally, the patient was educated on appropriate frequency, intensity and duration. Handout provided. Patient's previous HEP sheets were edited and highlighted to remove more simple exercises (from 2/26 and portions of 3/4), and to progress resistive exercises. Patient's questions were answered. (time included in 'other activities' above). 4/9 - green TB provided for standing SLR 3 way. 4/5 - The following exercises were performed and added to the patient's home exercise program. (SKTC, piriformis S, SB bridges, SB straight leg bridges, SB HS Curls, SB reverse crunch). Additionally, the patient was educated on appropriate frequency, intensity and duration. Handout provided. 3/29 - Patient instructed to continue gentle HSS and gasroc stretch. HF stretch not appropriate at this time as patient demos functional hip extension ROM and does not feel stretch with such activity. 3/26 - The following exercises were performed and added to the patient's home exercise program. (HSS GENTLE, gastroc S). Additionally, the patient was educated on appropriate frequency, intensity and duration. Handout provided. 3/12 - The following exercises were performed and added to the patient's home exercise program. (SLR abd, sidelying clamshells, adductor isometrics, prone knee flexion [pain free], prone SLR ext [pain free]), Additionally, the patient was educated on appropriate frequency, intensity and duration. Patient heavily edu'd to avoid pain, even slightly with prone knee flexion or prone SLR ext.  3/4 - The following exercises were performed and added to the patient's home exercise program. (SLR abd, SLR ext (in standing), standing TKE, glute sets). Additionally, the patient was educated on appropriate frequency, intensity and duration. Handout provided.   2/26 - The following exercises were performed and added to the patient's home exercise program (arabella pumps, quad sets, abdominal isometrics, prone laying). Additionally, the patient was educated on appropriate frequency, intensity and duration to perform. A detailed handout was provided to the patient. Manual Treatments:   3/12 - prone QUAD stretch; brief assessment of incision and sensitivity near incision - 5'  3/4 - knee flex/ext PROM (full ROM), hip flex to 80deg - 10'  2/26 - consider manual PROM (NO HIP FLEXION WITH KNEE EXTENSION)       Modalities: 2/26 - Consider MOC      Timed Code Treatment Minutes: 58    Total Treatment Minutes:  58    [] EVAL - LOW (23273)   [] EVAL - MOD (92222)  [] EVAL - HIGH (75143)  [] RE-EVAL (38331)  [x] YH(68648) x 2    [] IONTO  [x] NMR (54551) x 1     [] VASO  [] Manual (00814) x      [] Other:  [x] TA x 1      [] Mech Traction (56899)  [] ES(attended) (62050)      [] ES (un) (54102):   [] Aqua (43431) x   [] Group (60969) x   [] Gait training (58445) x      Treatment/Activity Tolerance:  [x] Patient tolerated treatment well [] Patient limited by fatigue  [] Patient limited by pain  [] Patient limited by other medical complications  [x] Other: The pt demos improved L eccentric quad control on this date as demonstrated through lateral heel taps and retro step overs. Additionally, pt continues to progress hamstring strengthening within the protocol set by Dr. Alvaro Alarcon.  Further skilled PT is needed in order to progress knee ROM and continue progression of rehabilitation program.       Prognosis: [x] Good [] Fair  [] Poor     Patient Requires Follow-up: [x] Yes  [] No     Plan:   [x] Continue per plan of care [] Alter current plan (see comments)  [] Plan of care initiated [] Hold pending MD visit [] Discharge     Plan for Next Session: PROGRESS CKC ACTIVITY, HAMSTRING FLEXIBILITY, LE STABILITY & STRENGTH  SEE PROXIMAL HAMSTRING REPAIR REHABILITATION OF DR Melquiades Stone IN [place]    Electronically signed by: Miah Workman, SPT  Therapist was present, directed the patient's care, made skilled judgement, and was responsible for assessment and treatment of the patient.         Aubrey Casey, DPT, PT

## 2019-04-29 ENCOUNTER — HOSPITAL ENCOUNTER (OUTPATIENT)
Dept: PHYSICAL THERAPY | Age: 59
Setting detail: THERAPIES SERIES
Discharge: HOME | End: 2019-04-29
Payer: COMMERCIAL

## 2019-04-29 PROCEDURE — 97112 NEUROMUSCULAR REEDUCATION: CPT

## 2019-04-29 PROCEDURE — 97530 THERAPEUTIC ACTIVITIES: CPT

## 2019-04-29 PROCEDURE — 97110 THERAPEUTIC EXERCISES: CPT

## 2019-04-29 NOTE — FLOWSHEET NOTE
Physical Therapy Daily Treatment Note  Date:  2019    Patient Name:  Lidya Lambert    :  1960  MRN: [id number]     Restrictions/Precautions:  AVOID EXCESSIVE HS STRETCHING OR LOADING HIP JOINT TOO HEAVILY WHEN IN DEEP RANGES OF FLEXION    Medical/Treatment Diagnosis Information:   · Diagnosis: S76.312D - Complete rupture of (L) proximal hamstring tendon, subsequent - 3 OF 4 HAMSTRINGS SURGICALLY REPAIRED ON 19  · Treatment Diagnosis: difficulty walking, L proximal hamstring repair, decreased strength, decreased flexibility, impaired balance      Tracking Information:  Physician Information Referring Practitioner: Dea Vargas MD     Plan of Care Sent Date:  Signed Received: Y   Visit Count / Total Visits   +     Insurance Approved Visits    Approved Dates:     Insurance Information PT Insurance Information: [facility] - [id number] precert     Progress Note/G-codes   []  Yes  [x]  No Next Due: #20     Pain level: 0/10    Subjective:   - Patient reports muscle soreness following last session in the R hip. This muscle soreness has since subsided and she reports no other adverse symptoms. She continues to be dedicated to her HEP. Objective:    Observation:   - Pt demos decreased ankle mobility bilaterally when performing back squats with Earthquake bar.   - Pt demos improved control and confidence with step overs of 8'' box.  - cues required to avoid trunk rotation w/ qped activity   - patient able to perform step up/over fwd & retro using LLE in pool  3/19 - Pt demos incorrect motor pattern with mini-squats as evidenced by the inability to maintain upright chest position and the knees traveling too far anterior over the toes.   3/22 - patient able to ambulate WBAT in pool    3/12 - incision healing without signs of infection/irritation; small 1cm x 1cm scab formed over middle of incision; patient's skin hypersensitive in area of incision  3/4 - requires significant VC/TC for correct technique and sequencing   TRX 1/4 Lunges 2x10 L in front (caution!)     TRX Squat + Heel Crush & DF    TRX Squats    Rebounder Tosses     TM Mush     TM Amb  Warm-Up     Upright Bike     Standing Quad Stretch     Lateral band walks     Stair Ambulation  (w/o handrail)    3-point excursion     Sport Cord        Cybex Leg Press    Quantum Knee Ext    Quantum Knee Curl 40# 3x10 DL      20# 3x15 DL      20# 2x10 DL Added 4/24            (Pt begins exercise in slight knee flexed position)   Forward step-ups on BOSU with sustained SLS x 20 x 3'' SLS  L only    6'' Forward/RetroStep Overs    8'' Forward/Retro Step Overs       Other Activities:    4/12 - The patient was provided an assessment including evaluative & functional tests and measures, as well as documentation to track progress. Extensive discussion with patient regarding return to work guidelines, and response needed from [employer] regarding disability benefits claim. Patient and PT discussion regarding HEP performance, as well as update of this program (please see below). - 60'  3/29 - Gait training was performed on this date w/o AD in order to increase gait speed, normalize arm swing, and improve heel strike/push-off with the LLE. Tactile and verbal cues were used in order to normalize gait mechanics. Patient demo'd good carryover at end of session. - 12'  3/26 - PT adjusted brace per MD recommendations (hip unlocked fully). Patient educated on exercising continued caution when out of brace. 3/22 - patient educated on WBAT, progression of WB, POC, scheduling aqua/dryland, and precautions at this time. Patient was given tour of pool, instructed on use of lockers and shown PT aquatic equipment. 2/26 - Patient educated on the focus of skilled physical therapy services and plan of care, including: diagnosis, prognosis, treatment goals & options.  Patient educated to continue to wear brace at night with sleeping; encouraged patient to continue to try to sit normally. Patient encouraged/educated to maintain touch down weight bearing at this time, and was educated on reducing axillary compression with B crutches. Home Exercise Program:    4/29 - Patient encouraged to continue with current HEP on a daily basis until her next PT appointment. Patient instructed to progress HEP exercises as she feels necessary through increasing number of sets and repetitions. Patient will avoid using resistance for HEP exercises until initiating more aggressive strengthening in the clinic under supervision of PT.  4/16 - verbally discussed and recommended standing quad stretch after proper performance in clinic. 4/12 - The following exercises were discussed and added to the patient's home exercise program. (supine SLR flex, qped glute kicks, qped LE extension, standing HF stretch, qped hip abd+ER). Additionally, the patient was educated on appropriate frequency, intensity and duration. Handout provided. Patient's previous HEP sheets were edited and highlighted to remove more simple exercises (from 2/26 and portions of 3/4), and to progress resistive exercises. Patient's questions were answered. (time included in 'other activities' above). 4/9 - green TB provided for standing SLR 3 way. 4/5 - The following exercises were performed and added to the patient's home exercise program. (SKTC, piriformis S, SB bridges, SB straight leg bridges, SB HS Curls, SB reverse crunch). Additionally, the patient was educated on appropriate frequency, intensity and duration. Handout provided. 3/29 - Patient instructed to continue gentle HSS and gasroc stretch. HF stretch not appropriate at this time as patient demos functional hip extension ROM and does not feel stretch with such activity. 3/26 - The following exercises were performed and added to the patient's home exercise program. (HSS GENTLE, gastroc S).  Additionally, the patient was educated on appropriate frequency, intensity and duration. Handout provided. 3/12 - The following exercises were performed and added to the patient's home exercise program. (SLR abd, sidelying clamshells, adductor isometrics, prone knee flexion [pain free], prone SLR ext [pain free]), Additionally, the patient was educated on appropriate frequency, intensity and duration. Patient heavily edu'd to avoid pain, even slightly with prone knee flexion or prone SLR ext.  3/4 - The following exercises were performed and added to the patient's home exercise program. (SLR abd, SLR ext (in standing), standing TKE, glute sets). Additionally, the patient was educated on appropriate frequency, intensity and duration. Handout provided. 2/26 - The following exercises were performed and added to the patient's home exercise program (arabella pumps, quad sets, abdominal isometrics, prone laying). Additionally, the patient was educated on appropriate frequency, intensity and duration to perform. A detailed handout was provided to the patient.     Manual Treatments:    4/29 - Gentle manual static hamstring stretch to LLE - 5'  3/12 - prone QUAD stretch; brief assessment of incision and sensitivity near incision - 5'  3/4 - knee flex/ext PROM (full ROM), hip flex to 80deg - 10'  2/26 - consider manual PROM (NO HIP FLEXION WITH KNEE EXTENSION)       Modalities: 2/26 - Consider MOC      Timed Code Treatment Minutes: 60    Total Treatment Minutes:  60     [] EVAL - LOW (13707)   [] EVAL - MOD (12229)  [] EVAL - HIGH (37526)  [] RE-EVAL (19092)  [x] UD(06731) x 2    [] IONTO  [x] NMR (32456) x 1     [] VASO  [] Manual (04474) x      [] Other:  [x] TA x  1    [] Mech Traction (97651)  [] ES(attended) (49694)      [] ES (un) (48460)  [] Aqua (63656) x   [] Group (76279)  [] Gait training (46895) x      Treatment/Activity Tolerance:  [x] Patient tolerated treatment well [] Patient limited by fatigue  [] Patient limited by pain  [] Patient limited by other medical

## 2019-05-08 ENCOUNTER — HOSPITAL ENCOUNTER (OUTPATIENT)
Dept: PHYSICAL THERAPY | Age: 59
Setting detail: THERAPIES SERIES
Discharge: HOME | End: 2019-05-08
Payer: COMMERCIAL

## 2019-05-08 PROCEDURE — 97112 NEUROMUSCULAR REEDUCATION: CPT

## 2019-05-08 PROCEDURE — 97530 THERAPEUTIC ACTIVITIES: CPT

## 2019-05-08 PROCEDURE — 97110 THERAPEUTIC EXERCISES: CPT

## 2019-05-08 NOTE — FLOWSHEET NOTE
front, 1 x 10 R in front     TRX Squat + Heel Crush & DF    TRX Squats    Rebounder Tosses     TM Mush     TM Amb  Warm-Up     Upright Bike     Standing Quad Stretch     Lateral band walks     Stair Ambulation  (w/o handrail)    3-point excursion     Sport Cord        Cybex Leg Press    Quantum Knee Ext    Quantum Knee Curl 50# 1x10 DL  70# 2x10 DL    40# 2x10 DL  20# 2 x 10 LLE    40# 2x10 DL  15# 2 x 10 LLE             (begin exercise in slight knee flexed position)  Knee 2: Ankle 1   Forward step-ups on BOSU with sustained SLS     6'' Forward/RetroStep Overs    8'' Forward/Retro Step Overs     TG Glute Press in Qped 2 x 10 LLE    TG Sidelying Single Leg Press LLE 1 x 10    Spine Neutral:  Hip Hinges    Squats    Edu   1 x 15    3 x 5    3'     SLS progression 15'' LLE on floor    15'' LLE on airex    Rebounder 2 x 10 LLE     Rebounder 2 x 10 LLE airex      Stationary Lunges LLE in front 2 x 5      Other Activities:    4/12 - The patient was provided an assessment including evaluative & functional tests and measures, as well as documentation to track progress. Extensive discussion with patient regarding return to work guidelines, and response needed from [facility] regarding disability benefits claim. Patient and PT discussion regarding HEP performance, as well as update of this program (please see below). - 60'  3/29 - Gait training was performed on this date w/o AD in order to increase gait speed, normalize arm swing, and improve heel strike/push-off with the LLE. Tactile and verbal cues were used in order to normalize gait mechanics. Patient demo'd good carryover at end of session. - 12'  3/26 - PT adjusted brace per MD recommendations (hip unlocked fully). Patient educated on exercising continued caution when out of brace. 3/22 - patient educated on WBAT, progression of WB, POC, scheduling aqua/dryland, and precautions at this time.  Patient was given tour of pool, instructed on use of lockers and shown PT aquatic equipment. 2/26 - Patient educated on the focus of skilled physical therapy services and plan of care, including: diagnosis, prognosis, treatment goals & options. Patient educated to continue to wear brace at night with sleeping; encouraged patient to continue to try to sit normally. Patient encouraged/educated to maintain touch down weight bearing at this time, and was educated on reducing axillary compression with B crutches. Home Exercise Program:    4/29 - Patient encouraged to continue with current HEP on a daily basis until her next PT appointment. Patient instructed to progress HEP exercises as she feels necessary through increasing number of sets and repetitions. Patient will avoid using resistance for HEP exercises until initiating more aggressive strengthening in the clinic under supervision of PT.  4/16 - verbally discussed and recommended standing quad stretch after proper performance in clinic. 4/12 - The following exercises were discussed and added to the patient's home exercise program. (supine SLR flex, qped glute kicks, qped LE extension, standing HF stretch, qped hip abd+ER). Additionally, the patient was educated on appropriate frequency, intensity and duration. Handout provided. Patient's previous HEP sheets were edited and highlighted to remove more simple exercises (from 2/26 and portions of 3/4), and to progress resistive exercises. Patient's questions were answered. (time included in 'other activities' above). 4/9 - green TB provided for standing SLR 3 way. 4/5 - The following exercises were performed and added to the patient's home exercise program. (SKTC, piriformis S, SB bridges, SB straight leg bridges, SB HS Curls, SB reverse crunch). Additionally, the patient was educated on appropriate frequency, intensity and duration. Handout provided. 3/29 - Patient instructed to continue gentle HSS and gasroc stretch.  HF stretch not appropriate at this time as patient demos functional hip extension ROM and does not feel stretch with such activity. 3/26 - The following exercises were performed and added to the patient's home exercise program. (HSS GENTLE, gastroc S). Additionally, the patient was educated on appropriate frequency, intensity and duration. Handout provided. 3/12 - The following exercises were performed and added to the patient's home exercise program. (SLR abd, sidelying clamshells, adductor isometrics, prone knee flexion [pain free], prone SLR ext [pain free]), Additionally, the patient was educated on appropriate frequency, intensity and duration. Patient heavily edu'd to avoid pain, even slightly with prone knee flexion or prone SLR ext.  3/4 - The following exercises were performed and added to the patient's home exercise program. (SLR abd, SLR ext (in standing), standing TKE, glute sets). Additionally, the patient was educated on appropriate frequency, intensity and duration. Handout provided. 2/26 - The following exercises were performed and added to the patient's home exercise program (arabella pumps, quad sets, abdominal isometrics, prone laying). Additionally, the patient was educated on appropriate frequency, intensity and duration to perform. A detailed handout was provided to the patient.     Manual Treatments:    5/8 - brief HS sticking LLE - 3'  4/29 - Gentle manual static hamstring stretch to LLE - 5'  3/12 - prone QUAD stretch; brief assessment of incision and sensitivity near incision - 5'  3/4 - knee flex/ext PROM (full ROM), hip flex to 80deg - 10'  2/26 - consider manual PROM (NO HIP FLEXION WITH KNEE EXTENSION)       Modalities: 2/26 - Consider MOC      Timed Code Treatment Minutes: 53    Total Treatment Minutes: 53      [] EVAL - LOW (47545)   [] EVAL - MOD (87581)  [] EVAL - HIGH (88081)  [] RE-EVAL (80811)  [x] SJ(07513) x 2    [] IONTO  [x] NMR (58524) x 1     [] VASO  [] Manual (91267) x      [] Other:  [x] TA x  1    [] Mech Traction (98682)  [] ES(attended) (16280)      [] ES (un) (98364)  [] Aqua (38027) x   [] Group (30241)  [] Gait training (62626) x      Treatment/Activity Tolerance:  [x] Patient tolerated treatment well [] Patient limited by fatigue  [] Patient limited by pain  [] Patient limited by other medical complications  [x] Other: The patient was able to progress several exercises on this date with no adverse effects noted. Patient demonstrates decrease balance and proprioception; therefore, LLE SL balance activities will continue to be implemented in order to improve this impairment. Furthermore, hamstring strengthening will continue to be incorporated as tolerated and within the standards of her protocol.        Prognosis: [x] Good [] Fair  [] Poor      Patient Requires Follow-up: [x] Yes  [] No     Plan:   [x] Continue per plan of care [] Alter current plan (see comments)  [] Plan of care initiated [] Hold pending MD visit [] Discharge      Plan for Next Session: HEP UPDATE NEXT VISIT, PROGRESS CKC ACTIVITY, HAMSTRING FLEXIBILITY AND STRENGTHENING, SQUATTING MECHANICS, BALANCE AND PROPRIOCEPTION DRILLS, ECCENTRIC CONTROL OF L QUAD AND GLUTE  SEE PROXIMAL HAMSTRING REPAIR REHABILITATION OF DR Yolanda Ricardo [place] [employer] [address]      Electronically signed by:  Kalin Pulido, PT, DPT

## 2019-05-11 ENCOUNTER — HOSPITAL ENCOUNTER (OUTPATIENT)
Dept: PHYSICAL THERAPY | Age: 59
Setting detail: THERAPIES SERIES
Discharge: HOME | End: 2019-05-11
Payer: COMMERCIAL

## 2019-05-11 PROCEDURE — 97112 NEUROMUSCULAR REEDUCATION: CPT

## 2019-05-11 PROCEDURE — 97530 THERAPEUTIC ACTIVITIES: CPT

## 2019-05-11 PROCEDURE — 97110 THERAPEUTIC EXERCISES: CPT

## 2019-05-11 NOTE — FLOWSHEET NOTE
Physical Therapy Daily Treatment Note  Date:  2019    Patient Name:  Dai Ramirez    :  1960  MRN: [id number]     Restrictions/Precautions:  AVOID EXCESSIVE HS STRETCHING OR LOADING HIP JOINT TOO HEAVILY WHEN IN DEEP RANGES OF FLEXION    Medical/Treatment Diagnosis Information:   · Diagnosis: S76.312D - Complete rupture of (L) proximal hamstring tendon, subsequent - 3 OF 4 HAMSTRINGS SURGICALLY REPAIRED ON 19  · Treatment Diagnosis: difficulty walking, L proximal hamstring repair, decreased strength, decreased flexibility, impaired balance      Tracking Information:  Physician Information Referring Practitioner: Isabella Vidal MD     Plan of Care Sent Date:  Signed Received: Y   Visit Count / Total Visits   +     Insurance Approved Visits    Approved Dates:     Insurance Information PT Insurance Information: ANTHE[employer] - 80/20 - 78D PCY then precert     Progress Note/G-codes   []  Yes  [x]  No Next Due: #20     Pain level: 0/10     Subjective: The patient was sore in muscles on Thursday. She ambulated 4 flights of stairs about 6 times that day, and noticed sore muscles in quads and hamstrings. She does comment that she is experiencing some left knee discomfort and weakness (\"feelings of it may give out\") with WB and stair ambulation or squatting. Objective:    Observation:   - Pt demos decreased ankle mobility bilaterally when performing back squats with Earthquake bar.   - Pt demos improved control and confidence with step overs of 8'' box.  - cues required to avoid trunk rotation w/ qped activity   - patient able to perform step up/over fwd & retro using LLE in pool  3/19 - Pt demos incorrect motor pattern with mini-squats as evidenced by the inability to maintain upright chest position and the knees traveling too far anterior over the toes.   3/22 - patient able to ambulate WBAT in pool    3/12 - incision healing without signs of infection/irritation; small 1cm x 1cm scab formed over middle of incision; patient's skin hypersensitive in area of incision  3/4 - proper form with standing exercises   Test measurements:  4/29 - Left Hamstring 90/90 flexibility = 65deg  4/24 - Left Hamstring 90/90 flexibility = 58deg   4/5 - Left Hamstring 90/90 flexibility = 40deg     Exercises:  Exercise/Equipment Resistance/Repetitions Other comments   Seated quad set (approx 50deg knee flex)    Ankle pumps    Abdominal isometric    Prone Laying    CC Standing 3-way SLR into flex, ext, abd   4/9: CC + resistance   Standing TKE (Touchdown WB on LLE) <<standing at table   Seated Glute Sets    Standing Glute Set << standing at table    Standing L Hip marches (w/ NO knee extension) <<standing at table   Hooklying Adductor Isometric    R sidelying Clamshells    R sidelying SLR abd    Prone SLR Ext << patient heavily edu'd to avoid/stop if even slightly pained   Prone Knee Flexion L <<ankle DF'd to prevent hamstring stretch sensation; patient heavily edu'd to avoid/stop if even slightly pained   Weight Shifting into Alternating Marches    Double leg bridges    Mini-squats w/ emphasis on glute squeeze at top    3/29 - Significant cueing for correct sequencing and technique   Gait Training   3/29 - Verbal and tactile cues to increase lacy, improve arm swing, improve heel strike and push-off with LLE   Standing hamstring curl    // bars            David Goff    Patient Edu    Supine SKTC    Supine Fig 4 Piriformis S    SB Bridges    SB Straight Leg Bridges    SB HS Curls    SB Reverse Crunch    R Bike    IB Gastroc 60''   Standing Adductor S 3 x 10'' L   IB HSS 2 x 30'' Lpost-session   TG SLR Abd    TG Squats + Hip Abd TB Loop    TG Squats    Qped Birddog 4/22 - Cues used in order to decrease pelvic rotation   Lateral Heel Taps 6'' 3 x 20 L ea     Prone Glute Kick w/ Knee Flexed @ 90    Rockerboard    Squats with Earthquake bar   Added 4/29:  Patient requires significant VC/TC for verbal cues were used in order to normalize gait mechanics. Patient demo'd good carryover at end of session. - 12'  3/26 - PT adjusted brace per MD recommendations (hip unlocked fully). Patient educated on exercising continued caution when out of brace. 3/22 - patient educated on WBAT, progression of WB, POC, scheduling aqua/dryland, and precautions at this time. Patient was given tour of pool, instructed on use of lockers and shown PT aquatic equipment. 2/26 - Patient educated on the focus of skilled physical therapy services and plan of care, including: diagnosis, prognosis, treatment goals & options. Patient educated to continue to wear brace at night with sleeping; encouraged patient to continue to try to sit normally. Patient encouraged/educated to maintain touch down weight bearing at this time, and was educated on reducing axillary compression with B crutches. Home Exercise Program:    4/29 - Patient encouraged to continue with current HEP on a daily basis until her next PT appointment. Patient instructed to progress HEP exercises as she feels necessary through increasing number of sets and repetitions. Patient will avoid using resistance for HEP exercises until initiating more aggressive strengthening in the clinic under supervision of PT.  4/16 - verbally discussed and recommended standing quad stretch after proper performance in clinic. 4/12 - The following exercises were discussed and added to the patient's home exercise program. (supine SLR flex, qped glute kicks, qped LE extension, standing HF stretch, qped hip abd+ER). Additionally, the patient was educated on appropriate frequency, intensity and duration. Handout provided. Patient's previous HEP sheets were edited and highlighted to remove more simple exercises (from 2/26 and portions of 3/4), and to progress resistive exercises. Patient's questions were answered. (time included in 'other activities' above).   4/9 - green TB provided for standing SLR 3 way. 4/5 - The following exercises were performed and added to the patient's home exercise program. (SKTC, piriformis S, SB bridges, SB straight leg bridges, SB HS Curls, SB reverse crunch). Additionally, the patient was educated on appropriate frequency, intensity and duration. Handout provided. 3/29 - Patient instructed to continue gentle HSS and gasroc stretch. HF stretch not appropriate at this time as patient demos functional hip extension ROM and does not feel stretch with such activity. 3/26 - The following exercises were performed and added to the patient's home exercise program. (HSS GENTLE, gastroc S). Additionally, the patient was educated on appropriate frequency, intensity and duration. Handout provided. 3/12 - The following exercises were performed and added to the patient's home exercise program. (SLR abd, sidelying clamshells, adductor isometrics, prone knee flexion [pain free], prone SLR ext [pain free]), Additionally, the patient was educated on appropriate frequency, intensity and duration. Patient heavily edu'd to avoid pain, even slightly with prone knee flexion or prone SLR ext.  3/4 - The following exercises were performed and added to the patient's home exercise program. (SLR abd, SLR ext (in standing), standing TKE, glute sets). Additionally, the patient was educated on appropriate frequency, intensity and duration. Handout provided. 2/26 - The following exercises were performed and added to the patient's home exercise program (arabella pumps, quad sets, abdominal isometrics, prone laying). Additionally, the patient was educated on appropriate frequency, intensity and duration to perform. A detailed handout was provided to the patient.     Manual Treatments:    5/8 - brief HS sticking LLE - 3'  4/29 - Gentle manual static hamstring stretch to LLE - 5'  3/12 - prone QUAD stretch; brief assessment of incision and sensitivity near incision - 5'  3/4 - knee flex/ext PROM (full ROM), hip flex to 80deg - 10'  2/26 - consider manual PROM (NO HIP FLEXION WITH KNEE EXTENSION)       Modalities: 2/26 - Consider MOC      Timed Code Treatment Minutes: 60    Total Treatment Minutes: 60     [] EVAL - LOW (24591)   [] EVAL - MOD (59125)  [] EVAL - HIGH (56217)  [] RE-EVAL (30546)  [x] TP(81813) x 2    [] IONTO  [x] NMR (51548) x 1     [] VASO  [] Manual (33043) x      [] Other:  [x] TA x  1    [] Mech Traction (62162)  [] ES(attended) (92273)      [] ES (un) (65026)  [] Aqua (99627) x   [] Group (98914)  [] Gait training (64561) x      Treatment/Activity Tolerance:  [x] Patient tolerated treatment well [] Patient limited by fatigue  [] Patient limited by pain  [] Patient limited by other medical complications  [x] Other: The patient progressed resisted leg exercises without pain and added dynamic warm up with proper form. The patient was able to progress CKC single leg activity on this date.        Prognosis: [x] Good [] Fair  [] Poor      Patient Requires Follow-up: [x] Yes  [] No     Plan:   [x] Continue per plan of care [] Alter current plan (see comments)  [] Plan of care initiated [] Hold pending MD visit [] Discharge      Plan for Next Session: HEP UPDATE NEXT VISIT, PROGRESS CKC ACTIVITY, HAMSTRING FLEXIBILITY AND STRENGTHENING, SQUATTING MECHANICS, BALANCE AND PROPRIOCEPTION DRILLS, ECCENTRIC CONTROL OF L QUAD AND GLUTE  SEE PROXIMAL HAMSTRING REPAIR REHABILITATION OF DR Fernanda Culver IN [address]      Electronically signed by:  Kb Carrington, PT, DPT

## 2019-05-13 ENCOUNTER — HOSPITAL ENCOUNTER (OUTPATIENT)
Dept: PHYSICAL THERAPY | Age: 59
Setting detail: THERAPIES SERIES
Discharge: HOME | End: 2019-05-13
Payer: COMMERCIAL

## 2019-05-13 PROCEDURE — 97110 THERAPEUTIC EXERCISES: CPT

## 2019-05-13 PROCEDURE — 97112 NEUROMUSCULAR REEDUCATION: CPT

## 2019-05-13 PROCEDURE — 97530 THERAPEUTIC ACTIVITIES: CPT

## 2019-05-13 NOTE — FLOWSHEET NOTE
Physical Therapy Daily Treatment Note  Date:  2019    Patient Name:  Eder Alfonso    :  1960  MRN: [id number]     Restrictions/Precautions:  AVOID EXCESSIVE HS STRETCHING OR LOADING HIP JOINT TOO HEAVILY WHEN IN DEEP RANGES OF FLEXION    Medical/Treatment Diagnosis Information:   · Diagnosis: S76.312D - Complete rupture of (L) proximal hamstring tendon, subsequent - 3 OF 4 HAMSTRINGS SURGICALLY REPAIRED ON 19  · Treatment Diagnosis: difficulty walking, L proximal hamstring repair, decreased strength, decreased flexibility, impaired balance      Tracking Information:  Physician Information Referring Practitioner: Debria Nageotte, MD     Plan of Care Sent Date:  Signed Received: Y   Visit Count / Total Visits   +     Insurance Approved Visits    Approved Dates:     Insurance Information PT Insurance Information: [place] [id number] [facility] precert     Progress Note/G-codes   []  Yes  [x]  No Next Due: #20     Pain level: 0/10     Subjective: The patient reports some mild soreness, but ready to work. Objective:    Observation:   - Pt demos decreased ankle mobility bilaterally when performing back squats with Earthquake bar.   - Pt demos improved control and confidence with step overs of 8'' box.  - cues required to avoid trunk rotation w/ qped activity   - patient able to perform step up/over fwd & retro using LLE in pool  3/19 - Pt demos incorrect motor pattern with mini-squats as evidenced by the inability to maintain upright chest position and the knees traveling too far anterior over the toes. 3/22 - patient able to ambulate WBAT in pool    3/12 - incision healing without signs of infection/irritation; small 1cm x 1cm scab formed over middle of incision; patient's skin hypersensitive in area of incision  3/4 - proper form with standing exercises   Test measurements:   - Prone HS MMT: 15. 4#L, 23. 7#R, Seated HS MMT: 17. 3#L, 20. 3#R   - Left Hamstring 90/90 flexibility = 65deg  4/24 - Left Hamstring 90/90 flexibility = 58deg   4/5 - Left Hamstring 90/90 flexibility = 40deg     Exercises:  Exercise/Equipment Resistance/Repetitions Other comments   Seated quad set (approx 50deg knee flex)    Ankle pumps    Abdominal isometric    Prone Laying    CC Standing 3-way SLR into flex, ext, abd   4/9: CC + resistance   Standing TKE (Touchdown WB on LLE) <<standing at table   Seated Glute Sets    Standing Glute Set << standing at table    Standing L Hip marches (w/ NO knee extension) <<standing at table   Hooklying Adductor Isometric    R sidelying Clamshells    R sidelying SLR abd    Prone SLR Ext << patient heavily edu'd to avoid/stop if even slightly pained   Prone Knee Flexion L <<ankle DF'd to prevent hamstring stretch sensation; patient heavily edu'd to avoid/stop if even slightly pained   Weight Shifting into Alternating Marches    Double leg bridges    Mini-squats w/ emphasis on glute squeeze at top    3/29 - Significant cueing for correct sequencing and technique   Gait Training   3/29 - Verbal and tactile cues to increase lacy, improve arm swing, improve heel strike and push-off with LLE   Standing hamstring curl    // bars            Keira Hayes    Patient Edu    Supine SKTC    Supine Fig 4 Piriformis S    SB Bridges    SB Straight Leg Bridges    SB HS Curls    SB Reverse Crunch    R Bike    IB Gastroc 60''   Standing Adductor S    IB HSS 2 x 30'' Lpost-session   TG SLR Abd    TG Squats + Hip Abd TB Loop    TG Squats    Qped Birddog 4/22 - Cues used in order to decrease pelvic rotation   Lateral Heel Taps 6'' 3 x 20 L ea     Prone Glute Kick w/ Knee Flexed @ 90    Rockerboard    Squats with Earthquake bar   Added 4/29:  Patient requires significant VC/TC for correct technique and sequencing   TRX Full Lunges 2 x 10 L in front, 1 x 10 R in front     TRX Squat + Heel Crush & DF    TRX Squats    Rebounder Tosses     TM Mush     TM Amb  Warm-Up Upright Bike     Standing Quad Stretch     Lateral band walks     Stair Ambulation  (w/o handrail)    3-point excursion     Sport Cord        Cybex Leg Press    Quantum Knee Ext    Quantum Knee Curl 120# 2x10 DL    45# 3x10 DL   L5, B2            Knee 2: Ankle 1; 1 set adducted   Forward step-ups on BOSU with sustained SLS     6'' Forward/RetroStep Overs    8'' Forward/Retro Step Overs     TG Glute Press in Qped     TG Sidelying Single Leg Press     Spine Neutral:  Hip Hinges    Squats    Edu       SLS progression          Dynamic Warm Up >Heel Walk  >Toe Walk  >Quad Walk   >Fig 4 Walk  >Lateral Squat  >Cross-n-Touch  >Helicopter 1/2 lunges   Single leg deadlift - R 10x, L 10x to 4'' box on side        Hip Abduction Machine    Hip Adduction        FM squat 75# 3 x 10      75# 3 x 10        40# 2 x 10    Y balance SLS MaMax     SLR Adduction     Stairs Up/Down     KB Squats (to 6'' step) 2 x 15 - 15# KB <<cued for focus on hamstring/gluteal attachment site   Split Squat 6'' Box in front/behind: 10x R/L EA        Lunges 10x R/L ea           Other Activities:    4/12 - The patient was provided an assessment including evaluative & functional tests and measures, as well as documentation to track progress. Extensive discussion with patient regarding return to work guidelines, and response needed from [place] regarding disability benefits claim. Patient and PT discussion regarding HEP performance, as well as update of this program (please see below). - 60'  3/29 - Gait training was performed on this date w/o AD in order to increase gait speed, normalize arm swing, and improve heel strike/push-off with the LLE. Tactile and verbal cues were used in order to normalize gait mechanics. Patient demo'd good carryover at end of session. - 12'  3/26 - PT adjusted brace per MD recommendations (hip unlocked fully). Patient educated on exercising continued caution when out of brace.    3/22 - patient educated on WBAT, progression of provided. 3/29 - Patient instructed to continue gentle HSS and gasroc stretch. HF stretch not appropriate at this time as patient demos functional hip extension ROM and does not feel stretch with such activity. 3/26 - The following exercises were performed and added to the patient's home exercise program. (HSS GENTLE, gastroc S). Additionally, the patient was educated on appropriate frequency, intensity and duration. Handout provided. 3/12 - The following exercises were performed and added to the patient's home exercise program. (SLR abd, sidelying clamshells, adductor isometrics, prone knee flexion [pain free], prone SLR ext [pain free]), Additionally, the patient was educated on appropriate frequency, intensity and duration. Patient heavily edu'd to avoid pain, even slightly with prone knee flexion or prone SLR ext.  3/4 - The following exercises were performed and added to the patient's home exercise program. (SLR abd, SLR ext (in standing), standing TKE, glute sets). Additionally, the patient was educated on appropriate frequency, intensity and duration. Handout provided. 2/26 - The following exercises were performed and added to the patient's home exercise program (arabella pumps, quad sets, abdominal isometrics, prone laying). Additionally, the patient was educated on appropriate frequency, intensity and duration to perform. A detailed handout was provided to the patient.     Manual Treatments:    5/8 - brief HS sticking LLE - 3'  4/29 - Gentle manual static hamstring stretch to LLE - 5'  3/12 - prone QUAD stretch; brief assessment of incision and sensitivity near incision - 5'  3/4 - knee flex/ext PROM (full ROM), hip flex to 80deg - 10'  2/26 - consider manual PROM (NO HIP FLEXION WITH KNEE EXTENSION)       Modalities: 2/26 - Consider MOC      Timed Code Treatment Minutes: 60    Total Treatment Minutes: 60     [] EVAL - LOW (50912)   [] EVAL - MOD (74755)  [] EVAL - HIGH (72379)  [] Ubaldo Norwooda (93742)  [x] KX(94845) x 2    [] IONTO  [x] NMR (09654) x 1     [] VASO  [] Manual (41620) x      [] Other:  [x] TA x  1    [] Mech Traction (06885)  [] ES(attended) (57475)      [] ES (un) (78306)  [] Aqua (84940) x   [] Group (48841)  [] Gait training (70779) x      Treatment/Activity Tolerance:  [x] Patient tolerated treatment well [] Patient limited by fatigue  [] Patient limited by pain  [] Patient limited by other medical complications  [x] Other: The patient performed unassisted lunges and split squats for the first time since surgery. Her strength, endurance and control continue to improve each week. Hamstring strength L:R remains limited and in need of improvement.        Prognosis: [x] Good [] Fair  [] Poor      Patient Requires Follow-up: [x] Yes  [] No     Plan:   [x] Continue per plan of care [] Alter current plan (see comments)  [] Plan of care initiated [] Hold pending MD visit [] Discharge      Plan for Next Session: PROGRESS CKC ACTIVITY, HAMSTRING FLEXIBILITY AND STRENGTHENING, SQUATTING MECHANICS, BALANCE AND PROPRIOCEPTION DRILLS, ECCENTRIC CONTROL OF L QUAD AND GLUTE  SEE PROXIMAL HAMSTRING REPAIR REHABILITATION OF DR Mendoza      Electronically signed by:  Sagar Hawley, PT, DPT

## 2019-05-14 RX ORDER — CETIRIZINE HYDROCHLORIDE 10 MG/1
TABLET ORAL
Qty: 90 TABLET | Refills: 1 | Status: SHIPPED | OUTPATIENT
Start: 2019-05-14 | End: 2020-01-07 | Stop reason: SDUPTHER

## 2019-05-15 ENCOUNTER — HOSPITAL ENCOUNTER (OUTPATIENT)
Dept: PHYSICAL THERAPY | Age: 59
Setting detail: THERAPIES SERIES
Discharge: HOME | End: 2019-05-15
Payer: COMMERCIAL

## 2019-05-15 PROCEDURE — 97530 THERAPEUTIC ACTIVITIES: CPT

## 2019-05-15 PROCEDURE — 97112 NEUROMUSCULAR REEDUCATION: CPT

## 2019-05-15 PROCEDURE — 97110 THERAPEUTIC EXERCISES: CPT

## 2019-05-15 NOTE — FLOWSHEET NOTE
Physical Therapy Daily Treatment Note  Date:  5/15/2019    Patient Name:  Tiffanie Amaya    :  1960  MRN: [id number]     Restrictions/Precautions:  AVOID EXCESSIVE HS STRETCHING OR LOADING HIP JOINT TOO HEAVILY WHEN IN DEEP RANGES OF FLEXION    Medical/Treatment Diagnosis Information:   · Diagnosis: S76.312D - Complete rupture of (L) proximal hamstring tendon, subsequent - 3 OF 4 HAMSTRINGS SURGICALLY REPAIRED ON 19  · Treatment Diagnosis: difficulty walking, L proximal hamstring repair, decreased strength, decreased flexibility, impaired balance      Tracking Information:  Physician Information Referring Practitioner: Jessica Mcgrath MD     Plan of Care Sent Date:  Signed Received: Y   Visit Count / Total Visits   +     Insurance Approved Visits    Approved Dates:     Insurance Information PT Insurance Information: [employer] - 80/20 - 57I PCY then precert     Progress Note/G-codes   []  Yes  [x]  No Next Due: #20     Pain level: 0/10     Subjective:  Denies pain, soreness or stiffness on this date. She will need to travel out of town this weekend and next, as her grandmother is ill and may pass. She requests PT put together a solid session for her to repeat while out of town, as she is highly compliant with established POC. 14 weeks s/p on this date. Objective:    Observation:   - Pt demos decreased ankle mobility bilaterally when performing back squats with Earthquake bar.   - Pt demos improved control and confidence with step overs of 8'' box.  - cues required to avoid trunk rotation w/ qped activity   - patient able to perform step up/over fwd & retro using LLE in pool  3/19 - Pt demos incorrect motor pattern with mini-squats as evidenced by the inability to maintain upright chest position and the knees traveling too far anterior over the toes.   3/22 - patient able to ambulate WBAT in pool    3/12 - incision healing without signs of infection/irritation; small 1cm x 1cm scab formed over middle of incision; patient's skin hypersensitive in area of incision  3/4 - proper form with standing exercises   Test measurements:  5/13 - Prone HS MMT: 15. 4#L, 23. 7#R, Seated HS MMT: 17. 3#L, 20. 3#R  4/29 - Left Hamstring 90/90 flexibility = 65deg  4/24 - Left Hamstring 90/90 flexibility = 58deg   4/5 - Left Hamstring 90/90 flexibility = 40deg     Exercises:  Exercise/Equipment Resistance/Repetitions Other comments   Seated quad set (approx 50deg knee flex)    Ankle pumps    Abdominal isometric    Prone Laying    CC Standing 3-way SLR into flex, ext, abd   4/9: CC + resistance   Standing TKE (Touchdown WB on LLE) <<standing at table   Seated Glute Sets    Standing Glute Set << standing at table    Standing L Hip marches (w/ NO knee extension) <<standing at table   Hooklying Adductor Isometric    R sidelying Clamshells    R sidelying SLR abd    Prone SLR Ext << patient heavily edu'd to avoid/stop if even slightly pained   Prone Knee Flexion L <<ankle DF'd to prevent hamstring stretch sensation; patient heavily edu'd to avoid/stop if even slightly pained   Weight Shifting into Alternating Marches    Double leg bridges    Mini-squats w/ emphasis on glute squeeze at top    3/29 - Significant cueing for correct sequencing and technique   Gait Training   3/29 - Verbal and tactile cues to increase lacy, improve arm swing, improve heel strike and push-off with LLE   Standing hamstring curl    // bars            Coleman Reinoso    Patient Edu    Supine SKTC    Supine Fig 4 Piriformis S    SB Bridges    SB Straight Leg Bridges    Prone HS Curls 3 x 10 5# L   SB HS Curls    SB Reverse Crunch    R Bike    IB Gastroc 60''   Standing Adductor S    IB HSS 1 x 60'' L   TG SLR Abd    TG Squats + Hip Abd TB Loop    TG Squats    Qped Birddog 4/22 - Cues used in order to decrease pelvic rotation   Lateral Heel Taps 6'' 3 x 20 L ea     Prone Glute Kick w/ Knee Flexed @ 80    Rockerboard    Squats with Earthquake bar   Added 4/29:  Patient requires significant VC/TC for correct technique and sequencing   TRX Full Lunges      TRX Squat + Heel Crush & DF    TRX Squats    Rebounder Tosses     TM Mush     TM Amb  Warm-Up     Upright Bike     Standing Quad Stretch     Lateral band walks     Stair Ambulation  (w/o handrail)    3-point excursion     Sport Cord        Cybex Leg Press      Quantum Knee Ext    Quantum Knee Curl 120# 1 x 10 DL  130# 1 x 10 DL  80# 2 x 10 LLE40# 2 x 10 DL  20# 2 x 10 LLE    45# 2 x 10 DL  15# 2 x 10 LLE L5, B2      Knee C, Ankle 2      Knee 3: Ankle 2; 1 set adducted w/ DL   Forward step-ups on BOSU with sustained SLS     6'' Forward/RetroStep Overs    8'' Forward/Retro Step Overs     TG Glute Press in Qped     TG Sidelying Single Leg Press     Spine Neutral:  Hip Hinges    Squats    Edu       SLS progression          Dynamic Warm Up >Heel Walk  >Toe Walk  >Quad Walk   >Fig 4 Walk  >Lateral Squat  >Cross-n-Touch  >Helicopter 1/2 lunges  >Lateral Band Walk - blue (laces)  >Dynamic Hamstring Walk  > Spiderman 2 x 3 R/L ea> Deadlift Walk      Hip Abduction Machine    Hip Adduction        FM squat     Y balance SLS MaMax     SLR Adduction     Stairs Up/Down     KB Squats (to 6'' step)  <<cued for focus on hamstring/gluteal attachment site   Split Squat    Lunges     Single leg deadlift -  R 10x, L 10x - 5# KB - to 4'' box <<fatigue set in, LLE shake noted                    Other Activities:    4/12 - The patient was provided an assessment including evaluative & functional tests and measures, as well as documentation to track progress. Extensive discussion with patient regarding return to work guidelines, and response needed from [employer] regarding disability benefits claim. Patient and PT discussion regarding HEP performance, as well as update of this program (please see below).  - 60'  3/29 - Gait training was performed on this date w/o AD in order to increase gait speed, normalize arm swing, and activities' above). 4/9 - green TB provided for standing SLR 3 way. 4/5 - The following exercises were performed and added to the patient's home exercise program. (SKTC, piriformis S, SB bridges, SB straight leg bridges, SB HS Curls, SB reverse crunch). Additionally, the patient was educated on appropriate frequency, intensity and duration. Handout provided. 3/29 - Patient instructed to continue gentle HSS and gasroc stretch. HF stretch not appropriate at this time as patient demos functional hip extension ROM and does not feel stretch with such activity. 3/26 - The following exercises were performed and added to the patient's home exercise program. (HSS GENTLE, gastroc S). Additionally, the patient was educated on appropriate frequency, intensity and duration. Handout provided. 3/12 - The following exercises were performed and added to the patient's home exercise program. (SLR abd, sidelying clamshells, adductor isometrics, prone knee flexion [pain free], prone SLR ext [pain free]), Additionally, the patient was educated on appropriate frequency, intensity and duration. Patient heavily edu'd to avoid pain, even slightly with prone knee flexion or prone SLR ext.  3/4 - The following exercises were performed and added to the patient's home exercise program. (SLR abd, SLR ext (in standing), standing TKE, glute sets). Additionally, the patient was educated on appropriate frequency, intensity and duration. Handout provided. 2/26 - The following exercises were performed and added to the patient's home exercise program (arabella pumps, quad sets, abdominal isometrics, prone laying). Additionally, the patient was educated on appropriate frequency, intensity and duration to perform. A detailed handout was provided to the patient.     Manual Treatments:    5/8 - brief HS sticking LLE - 3'  4/29 - Gentle manual static hamstring stretch to LLE - 5'  3/12 - prone QUAD stretch; brief assessment of incision and sensitivity near

## 2019-05-28 ENCOUNTER — HOSPITAL ENCOUNTER (OUTPATIENT)
Dept: PHYSICAL THERAPY | Age: 59
Setting detail: THERAPIES SERIES
Discharge: HOME | End: 2019-05-28
Payer: COMMERCIAL

## 2019-05-28 NOTE — PROGRESS NOTES
Physical Therapy  Cancellation/No-show Note  Patient Name:  Tyrone Mcconnell  :  1960   Date:  2019  Cancelled visits to date: 2  No-shows to date: 0    Patient status for today's appointment patient:  [x]  Cancelled ,   []  Rescheduled appointment  []  No-show     Reason given by patient:  []  Patient ill  []  Conflicting appointment  []  No transportation    []  Conflict with work  []  No reason given  [x]  Other:  Out of town due to family emergency   Comments:      Phone call information:   []  Phone call made today to patient at _ time at number provided:      []  Patient answered, conversation as follows:    []  Patient did not answer, message left as follows:  [x]  Phone call not made today    Electronically signed by:  Esha Hagan, PT

## 2019-05-31 ENCOUNTER — HOSPITAL ENCOUNTER (OUTPATIENT)
Dept: PHYSICAL THERAPY | Age: 59
Setting detail: THERAPIES SERIES
Discharge: HOME | End: 2019-05-31
Payer: COMMERCIAL

## 2019-05-31 PROCEDURE — 97530 THERAPEUTIC ACTIVITIES: CPT

## 2019-05-31 PROCEDURE — 97110 THERAPEUTIC EXERCISES: CPT

## 2019-05-31 PROCEDURE — 97112 NEUROMUSCULAR REEDUCATION: CPT

## 2019-05-31 NOTE — FLOWSHEET NOTE
Physical Therapy Daily Treatment Note  Date:  2019    Patient Name:  Rubina Jones    :  1960  MRN: [id number]     Restrictions/Precautions:  AVOID EXCESSIVE HS STRETCHING OR LOADING HIP JOINT TOO HEAVILY WHEN IN DEEP RANGES OF FLEXION    Medical/Treatment Diagnosis Information:   · Diagnosis: S76.312D - Complete rupture of (L) proximal hamstring tendon, subsequent - 3 OF 4 HAMSTRINGS SURGICALLY REPAIRED ON 19  · Treatment Diagnosis: difficulty walking, L proximal hamstring repair, decreased strength, decreased flexibility, impaired balance      Tracking Information:  Physician Information Referring Practitioner: Daphne Juarez MD     Plan of Care Sent Date:  Signed Received: Y   Visit Count / Total Visits   +     Insurance Approved Visits    Approved Dates:     Insurance Information PT Insurance Information: [place] [id number] [facility] then precert     Progress Note/G-codes   []  Yes  [x]  No Next Due: #20     Pain level: 0/10     Subjective:  Denies pain, soreness or stiffness on this date. She is going to see NP regarding some swelling in leg and hands. She walked a lot in [address] [place] [address], and still exercised with family. Objective:    Observation:   - Pt demos decreased ankle mobility bilaterally when performing back squats with Earthquake bar.   - Pt demos improved control and confidence with step overs of 8'' box.  - cues required to avoid trunk rotation w/ qped activity   - patient able to perform step up/over fwd & retro using LLE in pool  3/19 - Pt demos incorrect motor pattern with mini-squats as evidenced by the inability to maintain upright chest position and the knees traveling too far anterior over the toes.   3/22 - patient able to ambulate WBAT in pool    3/12 - incision healing without signs of infection/irritation; small 1cm x 1cm scab formed over middle of incision; patient's skin hypersensitive in area of incision  3/4 - proper form with standing exercises   Test measurements:  5/13 - Prone HS MMT: 15. 4#L, 23. 7#R, Seated HS MMT: 17. 3#L, 20. 3#R  4/29 - Left Hamstring 90/90 flexibility = 65deg  4/24 - Left Hamstring 90/90 flexibility = 58deg   4/5 - Left Hamstring 90/90 flexibility = 40deg     Exercises:  Exercise/Equipment Resistance/Repetitions Other comments   Seated quad set (approx 50deg knee flex)    Ankle pumps    Abdominal isometric    Prone Laying    CC Standing 3-way SLR into flex, ext, abd   4/9: CC + resistance   Standing TKE (Touchdown WB on LLE) <<standing at table   Seated Glute Sets    Standing Glute Set << standing at table    Standing L Hip marches (w/ NO knee extension) <<standing at table   Hooklying Adductor Isometric    R sidelying Clamshells    R sidelying SLR abd    Prone SLR Ext << patient heavily edu'd to avoid/stop if even slightly pained   Prone Knee Flexion L <<ankle DF'd to prevent hamstring stretch sensation; patient heavily edu'd to avoid/stop if even slightly pained   Weight Shifting into Alternating Marches    Double leg bridges    Mini-squats w/ emphasis on glute squeeze at top    3/29 - Significant cueing for correct sequencing and technique   Gait Training   3/29 - Verbal and tactile cues to increase lacy, improve arm swing, improve heel strike and push-off with LLE   Standing hamstring curl    // bars            Radha Austin    Patient Edu    Supine SKTC    Supine Fig 4 Piriformis S    SB Bridges    SB Straight Leg Bridges    Prone HS Curls    SB HS Curls    SB Reverse Crunch    R Bike    IB Gastroc 60''   Standing Adductor S    IB HSS    TG SLR Abd    TG Squats + Hip Abd TB Loop    TG Squats    Qped Birddog 4/22 - Cues used in order to decrease pelvic rotation   Lateral Heel Taps      Prone Glute Kick w/ Knee Flexed @ 90    Rockerboard    Squats with Earthquake bar   Added 4/29:  Patient requires significant VC/TC for correct technique and sequencing   TRX Full Lunges      TRX Squat + Heel Crush & DF    TRX Squats    Rebounder Tosses     TM Mush     TM Amb  Warm-Up     Upright Bike     Standing Quad Stretch     Lateral band walks     Stair Ambulation  (w/o handrail)    3-point excursion     Sport Cord        Cybex Leg Press        Quantum Knee Ext      Quantum Knee Curl   150# 3 x 10 DL  50# 3 x 10 DL      50# 3 x 10 DL   L4, B2          Knee C, Ankle 2        Knee 3: Ankle 2; 1 set adducted w/ DL   Forward step-ups on BOSU with sustained SLS     6'' Forward/RetroStep Overs    8'' Forward/Retro Step Overs     TG Glute Press in Qped     TG Sidelying Single Leg Press     Spine Neutral:  Hip Hinges    Squats    Edu       SLS progression          Dynamic Warm Up       Hip Abduction Machine    Hip Adduction        FM squat     Y balance SLS MaMax     SLR Adduction     Stairs Up/Down     KB Squats (to 6'' step)  <<cued for focus on hamstring/gluteal attachment site   Split Squat    Lunges     Single leg deadlift -   <<fatigue set in, LLE shake noted   Ladders 2 laps:  In/in  Lateral in/out  Zig zag      Line Hops Fwd/Lateral - 10x ea    Box Hops 4'' DL - 10x    Sport Cord (doubled - facing away from anchor) Fwd Step up 8'' LLE >> Deadlift LLE stance >> Step Down R - 10x, PT SBA    Bent Over HS Curls      Bent Over SLR Extension 2 x 10 7.5# LLE        2 x 10 7.5#     Standing Lateral Kicks 5# 2 x 10 R/L ea    Running Man 3 x 15'' LLE down      Other Activities:    4/12 - The patient was provided an assessment including evaluative & functional tests and measures, as well as documentation to track progress. Extensive discussion with patient regarding return to work guidelines, and response needed from [employer] regarding disability benefits claim. Patient and PT discussion regarding HEP performance, as well as update of this program (please see below). - 60'  3/29 - Gait training was performed on this date w/o AD in order to increase gait speed, normalize arm swing, and improve heel strike/push-off with the LLE.  Tactile and verbal cues were used in order to normalize gait mechanics. Patient demo'd good carryover at end of session. - 12'  3/26 - PT adjusted brace per MD recommendations (hip unlocked fully). Patient educated on exercising continued caution when out of brace. 3/22 - patient educated on WBAT, progression of WB, POC, scheduling aqua/dryland, and precautions at this time. Patient was given tour of pool, instructed on use of lockers and shown PT aquatic equipment. 2/26 - Patient educated on the focus of skilled physical therapy services and plan of care, including: diagnosis, prognosis, treatment goals & options. Patient educated to continue to wear brace at night with sleeping; encouraged patient to continue to try to sit normally. Patient encouraged/educated to maintain touch down weight bearing at this time, and was educated on reducing axillary compression with B crutches. Home Exercise Program:    4/29 - Patient encouraged to continue with current HEP on a daily basis until her next PT appointment. Patient instructed to progress HEP exercises as she feels necessary through increasing number of sets and repetitions. Patient will avoid using resistance for HEP exercises until initiating more aggressive strengthening in the clinic under supervision of PT.  4/16 - verbally discussed and recommended standing quad stretch after proper performance in clinic. 4/12 - The following exercises were discussed and added to the patient's home exercise program. (supine SLR flex, qped glute kicks, qped LE extension, standing HF stretch, qped hip abd+ER). Additionally, the patient was educated on appropriate frequency, intensity and duration. Handout provided. Patient's previous HEP sheets were edited and highlighted to remove more simple exercises (from 2/26 and portions of 3/4), and to progress resistive exercises. Patient's questions were answered. (time included in 'other activities' above).   4/9 - green TB provided for standing SLR 3 way. 4/5 - The following exercises were performed and added to the patient's home exercise program. (SKTC, piriformis S, SB bridges, SB straight leg bridges, SB HS Curls, SB reverse crunch). Additionally, the patient was educated on appropriate frequency, intensity and duration. Handout provided. 3/29 - Patient instructed to continue gentle HSS and gasroc stretch. HF stretch not appropriate at this time as patient demos functional hip extension ROM and does not feel stretch with such activity. 3/26 - The following exercises were performed and added to the patient's home exercise program. (HSS GENTLE, gastroc S). Additionally, the patient was educated on appropriate frequency, intensity and duration. Handout provided. 3/12 - The following exercises were performed and added to the patient's home exercise program. (SLR abd, sidelying clamshells, adductor isometrics, prone knee flexion [pain free], prone SLR ext [pain free]), Additionally, the patient was educated on appropriate frequency, intensity and duration. Patient heavily edu'd to avoid pain, even slightly with prone knee flexion or prone SLR ext.  3/4 - The following exercises were performed and added to the patient's home exercise program. (SLR abd, SLR ext (in standing), standing TKE, glute sets). Additionally, the patient was educated on appropriate frequency, intensity and duration. Handout provided. 2/26 - The following exercises were performed and added to the patient's home exercise program (arabella pumps, quad sets, abdominal isometrics, prone laying). Additionally, the patient was educated on appropriate frequency, intensity and duration to perform. A detailed handout was provided to the patient.     Manual Treatments:    5/8 - brief HS sticking LLE - 3'  4/29 - Gentle manual static hamstring stretch to LLE - 5'  3/12 - prone QUAD stretch; brief assessment of incision and sensitivity near incision - 5'  3/4 - knee flex/ext PROM (full ROM), hip flex to 80deg - 10'  2/26 - consider manual PROM (NO HIP FLEXION WITH KNEE EXTENSION)       Modalities: 2/26 - Consider MOC      Timed Code Treatment Minutes: 55    Total Treatment Minutes: 55     [] EVAL - LOW (70057)   [] EVAL - MOD (00436)  [] EVAL - HIGH (17793)  [] RE-EVAL (66780)  [x] XT(32225) x 2    [] IONTO  [x] NMR (25914) x 1     [] VASO  [] Manual (53181) x      [] Other:  [x] TA x  1    [] Mech Traction (32373)  [] ES(attended) (08842)      [] ES (un) (67434)  [] Aqua (10611) x   [] Group (33258)  [] Gait training (03529) x      Treatment/Activity Tolerance:  [x] Patient tolerated treatment well [] Patient limited by fatigue  [] Patient limited by pain  [] Patient limited by other medical complications  [x] Other: The patient was able to progress exercises this date in regards to resistance. She performed new, and more intensive hip/glute activities without pain. Also, the patient was able to perform hops and box jumps with proper form, without sxs.        Prognosis: [x] Good [] Fair  [] Poor      Patient Requires Follow-up: [x] Yes  [] No     Plan:   [x] Continue per plan of care [] Alter current plan (see comments)  [] Plan of care initiated [] Hold pending MD visit [] Discharge      Plan for Next Session: PROGRESS CKC ACTIVITY, HAMSTRING FLEXIBILITY AND STRENGTHENING, SQUATTING MECHANICS, BALANCE AND PROPRIOCEPTION DRILLS, ECCENTRIC CONTROL OF L QUAD AND GLUTE  SEE PROXIMAL HAMSTRING REPAIR REHABILITATION OF DR Mendoza      Electronically signed by:  Frances Avalos, PT, DPT

## 2019-06-03 ENCOUNTER — OFFICE VISIT (OUTPATIENT)
Dept: INTERNAL MEDICINE CLINIC | Age: 59
End: 2019-06-03
Payer: COMMERCIAL

## 2019-06-03 ENCOUNTER — HOSPITAL ENCOUNTER (OUTPATIENT)
Dept: PHYSICAL THERAPY | Age: 59
Setting detail: THERAPIES SERIES
Discharge: HOME | End: 2019-06-03
Payer: COMMERCIAL

## 2019-06-03 VITALS
HEIGHT: 67 IN | BODY MASS INDEX: 35.63 KG/M2 | WEIGHT: 227 LBS | HEART RATE: 88 BPM | DIASTOLIC BLOOD PRESSURE: 86 MMHG | SYSTOLIC BLOOD PRESSURE: 132 MMHG

## 2019-06-03 DIAGNOSIS — M79.89 LEG SWELLING: Primary | ICD-10-CM

## 2019-06-03 DIAGNOSIS — R10.12 LEFT UPPER QUADRANT PAIN: ICD-10-CM

## 2019-06-03 LAB
A/G RATIO: 1.7 (ref 1.1–2.2)
ALBUMIN SERPL-MCNC: 4.7 G/DL (ref 3.4–5)
ALP BLD-CCNC: 84 U/L (ref 40–129)
ALT SERPL-CCNC: 21 U/L (ref 10–40)
ANION GAP SERPL CALCULATED.3IONS-SCNC: 14 MMOL/L (ref 3–16)
AST SERPL-CCNC: 22 U/L (ref 15–37)
BASOPHILS ABSOLUTE: 0 K/UL (ref 0–0.2)
BASOPHILS RELATIVE PERCENT: 0.2 %
BILIRUB SERPL-MCNC: <0.2 MG/DL (ref 0–1)
BUN BLDV-MCNC: 10 MG/DL (ref 7–20)
CALCIUM SERPL-MCNC: 10.1 MG/DL (ref 8.3–10.6)
CHLORIDE BLD-SCNC: 101 MMOL/L (ref 99–110)
CO2: 25 MMOL/L (ref 21–32)
CREAT SERPL-MCNC: 0.9 MG/DL (ref 0.6–1.1)
EOSINOPHILS ABSOLUTE: 0.2 K/UL (ref 0–0.6)
EOSINOPHILS RELATIVE PERCENT: 2.8 %
GFR AFRICAN AMERICAN: >60
GFR NON-AFRICAN AMERICAN: >60
GLOBULIN: 2.8 G/DL
GLUCOSE BLD-MCNC: 116 MG/DL (ref 70–99)
HCT VFR BLD CALC: 41.8 % (ref 36–48)
HEMOGLOBIN: 13.8 G/DL (ref 12–16)
LIPASE: 132 U/L (ref 13–60)
LYMPHOCYTES ABSOLUTE: 2.4 K/UL (ref 1–5.1)
LYMPHOCYTES RELATIVE PERCENT: 38.6 %
MCH RBC QN AUTO: 29.6 PG (ref 26–34)
MCHC RBC AUTO-ENTMCNC: 33.1 G/DL (ref 31–36)
MCV RBC AUTO: 89.3 FL (ref 80–100)
MONOCYTES ABSOLUTE: 0.5 K/UL (ref 0–1.3)
MONOCYTES RELATIVE PERCENT: 7.8 %
NEUTROPHILS ABSOLUTE: 3.1 K/UL (ref 1.7–7.7)
NEUTROPHILS RELATIVE PERCENT: 50.6 %
PDW BLD-RTO: 14.9 % (ref 12.4–15.4)
PLATELET # BLD: 272 K/UL (ref 135–450)
PMV BLD AUTO: 7.8 FL (ref 5–10.5)
POTASSIUM SERPL-SCNC: 3.8 MMOL/L (ref 3.5–5.1)
PRO-BNP: 47 PG/ML (ref 0–124)
RBC # BLD: 4.68 M/UL (ref 4–5.2)
SODIUM BLD-SCNC: 140 MMOL/L (ref 136–145)
SODIUM URINE: 23 MMOL/L
TOTAL PROTEIN: 7.5 G/DL (ref 6.4–8.2)
WBC # BLD: 6.2 K/UL (ref 4–11)

## 2019-06-03 PROCEDURE — 99213 OFFICE O/P EST LOW 20 MIN: CPT | Performed by: NURSE PRACTITIONER

## 2019-06-03 PROCEDURE — 97530 THERAPEUTIC ACTIVITIES: CPT

## 2019-06-03 PROCEDURE — 97110 THERAPEUTIC EXERCISES: CPT

## 2019-06-03 PROCEDURE — 97750 PHYSICAL PERFORMANCE TEST: CPT

## 2019-06-03 ASSESSMENT — PATIENT HEALTH QUESTIONNAIRE - PHQ9
SUM OF ALL RESPONSES TO PHQ QUESTIONS 1-9: 0
1. LITTLE INTEREST OR PLEASURE IN DOING THINGS: 0
SUM OF ALL RESPONSES TO PHQ9 QUESTIONS 1 & 2: 0
SUM OF ALL RESPONSES TO PHQ QUESTIONS 1-9: 0
2. FEELING DOWN, DEPRESSED OR HOPELESS: 0

## 2019-06-03 NOTE — PROGRESS NOTES
SUBJECTIVE:    Patient ID: Fela Hirsch is a 63 y.o. female. CC: leg swelling, abdominal pain    HPI: The patient presents to the office for an acute visit. Bilateral lower leg swelling which occurred while she was vacationing. She admits to being on her feet more and eating differently. This improved when she returned home. There is no associated chest pain or shortness of breath. There are no skin changes to her legs. No unilateral swelling. No treatments at home. She reports left upper abdominal pain and tenderness. She reports normal bowel and bladder. No treatments at home. Current Outpatient Medications   Medication Sig Dispense Refill    cetirizine (ZYRTEC) 10 MG tablet TAKE 1 TABLET BY MOUTH DAILY AT BEDTIME 90 tablet 1    metFORMIN (GLUCOPHAGE) 1000 MG tablet Take 1 tablet by mouth 2 times daily      pravastatin (PRAVACHOL) 20 MG tablet Take 20 mg by mouth daily NOT TAKING      fluticasone (FLONASE) 50 MCG/ACT nasal spray 2 sprays by Nasal route daily       azelastine HCl 0.15 % SOLN SPRAY TWICE INTO EACH NOSTRIL BID PRF ALLERGY SYMPTOMS  1     No current facility-administered medications for this visit. Review of Systems   Constitutional: Negative. Respiratory: Negative for shortness of breath. Cardiovascular: Positive for leg swelling. Negative for chest pain. Gastrointestinal: Positive for abdominal pain. Negative for abdominal distention, blood in stool, constipation, diarrhea, nausea and vomiting. Genitourinary: Negative. All other systems reviewed and are negative. OBJECTIVE:  Physical Exam   Constitutional: She appears well-developed and well-nourished. No distress. HENT:   Head: Normocephalic and atraumatic. Cardiovascular: Normal rate and regular rhythm. Pulses:       Dorsalis pedis pulses are 2+ on the right side, and 2+ on the left side. Pulmonary/Chest: Effort normal and breath sounds normal.   Abdominal: Soft.  Bowel sounds are normal. She exhibits no distension. There is tenderness in the left upper quadrant. There is no rigidity and no guarding. Musculoskeletal:        Right ankle: She exhibits no swelling. Left ankle: She exhibits no swelling. Right lower leg: She exhibits no swelling. Left lower leg: She exhibits no swelling. Skin: Skin is warm and dry. She is not diaphoretic. No erythema. Psychiatric: She has a normal mood and affect. Her behavior is normal.      /86   Pulse 88   Ht 5' 7\" (1.702 m)   Wt 227 lb (103 kg)   BMI 35.55 kg/m²      PHQ Scores 6/3/2019 8/7/2018   PHQ2 Score 0 0   PHQ9 Score 0 0     Interpretation of Total Score Depression Severity: 1-4 = Minimal depression, 5-9 = Mild depression, 10-14 = Moderate depression, 15-19 = Moderately severe depression, 20-27 =Severe depression        ASSESSMENT/PLAN:  Natalie Darling was seen today for abdominal pain and edema. Diagnoses and all orders for this visit:    Leg swelling  - Suspect related to travel, being on feet, eating differently  - Improved once she returned home  - No red flags  - Recommended elevation, compression, low salt diet.   She wishes for additional evaluation.  -     Comprehensive Metabolic Panel  -     SODIUM, URINE, RANDOM  -     Brain Natriuretic Peptide  -     Protein / Creatinine Ratio, Urine    Left upper quadrant pain  - Left upper quadrant pain and tenderness  - No bowel or bladder changes  - Check labs  -     Comprehensive Metabolic Panel  -     LIPASE  -     CBC WITH AUTO DIFFERENTIAL      DEE DEE Hernandez - CNP

## 2019-06-03 NOTE — FLOWSHEET NOTE
Physical Therapy Daily Treatment Note  Date:  6/3/2019    Patient Name:  Lidya Lambert    :  1960  MRN: [id number]     Restrictions/Precautions:  AVOID EXCESSIVE HS STRETCHING OR LOADING HIP JOINT TOO HEAVILY WHEN IN DEEP RANGES OF FLEXION    Medical/Treatment Diagnosis Information:   · Diagnosis: S76.312D - Complete rupture of (L) proximal hamstring tendon, subsequent - 3 OF 4 HAMSTRINGS SURGICALLY REPAIRED ON 19  · Treatment Diagnosis: difficulty walking, L proximal hamstring repair, decreased strength, decreased flexibility, impaired balance      Tracking Information:  Physician Information Referring Practitioner: Dea Vargas MD     Plan of Care Sent Date:  Signed Received: Y   Visit Count / Total Visits   +     Insurance Approved Visits    Approved Dates:     Insurance Information PT Insurance Information: ANTHEM - 80/20 - 65O PCY then precert     Progress Note/G-codes   [x]  Yes  []  No Next Due: #20     Pain level: 0/10     Subjective:  SEE PN    Objective:  SEE PN    Observation:   - Pt demos decreased ankle mobility bilaterally when performing back squats with Earthquake bar.   - Pt demos improved control and confidence with step overs of 8'' box.  - cues required to avoid trunk rotation w/ qped activity   - patient able to perform step up/over fwd & retro using LLE in pool  3/19 - Pt demos incorrect motor pattern with mini-squats as evidenced by the inability to maintain upright chest position and the knees traveling too far anterior over the toes.   3/22 - patient able to ambulate WBAT in pool    3/12 - incision healing without signs of infection/irritation; small 1cm x 1cm scab formed over middle of incision; patient's skin hypersensitive in area of incision  3/4 - proper form with standing exercises   Test measurements:  6/3/19 - Isokinetic Testing / Bilateral Difference:  Quadricep 180 deg/sec: 34.5%   [x] Surplus 300 deg/sec: 4.4% [] Deficit [x] Surplus   Hamstring 180 deg/sec: 17.9% [x] Deficit   [] Surplus 300 deg/sec: 16.9% [x] Deficit   [] Surplus   5/13 - Prone HS MMT: 15. 4#L, 23. 7#R, Seated HS MMT: 17. 3#L, 20. 3#R  4/29 - Left Hamstring 90/90 flexibility = 65deg  4/24 - Left Hamstring 90/90 flexibility = 58deg   4/5 - Left Hamstring 90/90 flexibility = 40deg     Exercises:  Exercise/Equipment Resistance/Repetitions Other comments   Seated quad set (approx 50deg knee flex)    Ankle pumps    Abdominal isometric    Prone Laying    CC Standing 3-way SLR into flex, ext, abd   4/9: CC + resistance   Standing TKE (Touchdown WB on LLE) <<standing at table   Seated Glute Sets    Standing Glute Set << standing at table    Standing L Hip marches (w/ NO knee extension) <<standing at table   Hooklying Adductor Isometric    R sidelying Clamshells    R sidelying SLR abd    Prone SLR Ext << patient heavily edu'd to avoid/stop if even slightly pained   Prone Knee Flexion L <<ankle DF'd to prevent hamstring stretch sensation; patient heavily edu'd to avoid/stop if even slightly pained   Weight Shifting into Alternating Marches    Double leg bridges    Mini-squats w/ emphasis on glute squeeze at top    3/29 - Significant cueing for correct sequencing and technique   Gait Training   3/29 - Verbal and tactile cues to increase lacy, improve arm swing, improve heel strike and push-off with LLE   Standing hamstring curl    // bars            Paul Lord    Patient Edu    Supine SKTC    Supine Fig 4 Piriformis S    SB Bridges    SB Straight Leg Bridges    Prone HS Curls    SB HS Curls    SB Reverse Crunch    Upright Bike L3, 5'   IB Gastroc 61''   Standing Adductor S    IB HSS 2 x 60'' L   TG SLR Abd    TG Squats + Hip Abd TB Loop    TG Squats    Qped Birddog 4/22 - Cues used in order to decrease pelvic rotation   Lateral Heel Taps      Prone Glute Kick w/ Knee Flexed @ 90    Rockerboard    Squats with Earthquake bar   Added 4/29:  Patient requires significant VC/TC for correct technique and sequencing   TRX Full Lunges      TRX Squat + Heel Crush & DF    TRX Squats    Rebounder Tosses     TM Mush     TM Amb  Warm-Up     Upright Bike     Standing Quad Stretch     Lateral band walks     Stair Ambulation  (w/o handrail)    3-point excursion     Sport Cord        Cybex Leg Press        Quantum Knee Ext      Quantum Knee Curl              L4, B2          Knee C, Ankle 2        Knee 3: Ankle 2; 1 set adducted w/ DL   Forward step-ups on BOSU with sustained SLS     6'' Forward/RetroStep Overs    8'' Forward/Retro Step Overs     TG Glute Press in Qped     TG Sidelying Single Leg Press     Spine Neutral:  Hip Hinges    Squats    Edu       SLS progression          Dynamic Warm Up       Hip Abduction Machine    Hip Adduction        FM squat     Y balance SLS MaMax     SLR Adduction     Stairs Up/Down     KB Squats (to 6'' step)  <<cued for focus on hamstring/gluteal attachment site   Split Squat    Lunges     Single leg deadlift -   <<fatigue set in, LLE shake noted   Ladders    [employer]    Box Hops    Sport Cord (doubled - facing away from anchor)    Bent Over HS Curls      Bent Over SLR Extension    Standing Lateral Kicks    Running Man      Other Activities:    6/3 - The patient was provided an assessment including evaluative tests and measures, as well as documentation to track progress. The patient was physical performance tested with isokinetic biodex equipment. - 39'  4/12 - The patient was provided an assessment including evaluative & functional tests and measures, as well as documentation to track progress. Extensive discussion with patient regarding return to work guidelines, and response needed from [employer] regarding disability benefits claim. Patient and PT discussion regarding HEP performance, as well as update of this program (please see below).  - 60'  3/29 - Gait training was performed on this date w/o AD in order to increase gait speed, normalize arm swing, and improve heel strike/push-off with the LLE. Tactile and verbal cues were used in order to normalize gait mechanics. Patient demo'd good carryover at end of session. - 12'  3/26 - PT adjusted brace per MD recommendations (hip unlocked fully). Patient educated on exercising continued caution when out of brace. 3/22 - patient educated on WBAT, progression of WB, POC, scheduling aqua/dryland, and precautions at this time. Patient was given tour of pool, instructed on use of lockers and shown PT aquatic equipment. 2/26 - Patient educated on the focus of skilled physical therapy services and plan of care, including: diagnosis, prognosis, treatment goals & options. Patient educated to continue to wear brace at night with sleeping; encouraged patient to continue to try to sit normally. Patient encouraged/educated to maintain touch down weight bearing at this time, and was educated on reducing axillary compression with B crutches. Home Exercise Program:    4/29 - Patient encouraged to continue with current HEP on a daily basis until her next PT appointment. Patient instructed to progress HEP exercises as she feels necessary through increasing number of sets and repetitions. Patient will avoid using resistance for HEP exercises until initiating more aggressive strengthening in the clinic under supervision of PT.  4/16 - verbally discussed and recommended standing quad stretch after proper performance in clinic. 4/12 - The following exercises were discussed and added to the patient's home exercise program. (supine SLR flex, qped glute kicks, qped LE extension, standing HF stretch, qped hip abd+ER). Additionally, the patient was educated on appropriate frequency, intensity and duration. Handout provided. Patient's previous HEP sheets were edited and highlighted to remove more simple exercises (from 2/26 and portions of 3/4), and to progress resistive exercises.  Patient's questions were answered. (time included in 'other activities' above). 4/9 - green TB provided for standing SLR 3 way. 4/5 - The following exercises were performed and added to the patient's home exercise program. (SKTC, piriformis S, SB bridges, SB straight leg bridges, SB HS Curls, SB reverse crunch). Additionally, the patient was educated on appropriate frequency, intensity and duration. Handout provided. 3/29 - Patient instructed to continue gentle HSS and gasroc stretch. HF stretch not appropriate at this time as patient demos functional hip extension ROM and does not feel stretch with such activity. 3/26 - The following exercises were performed and added to the patient's home exercise program. (HSS GENTLE, gastroc S). Additionally, the patient was educated on appropriate frequency, intensity and duration. Handout provided. 3/12 - The following exercises were performed and added to the patient's home exercise program. (SLR abd, sidelying clamshells, adductor isometrics, prone knee flexion [pain free], prone SLR ext [pain free]), Additionally, the patient was educated on appropriate frequency, intensity and duration. Patient heavily edu'd to avoid pain, even slightly with prone knee flexion or prone SLR ext.  3/4 - The following exercises were performed and added to the patient's home exercise program. (SLR abd, SLR ext (in standing), standing TKE, glute sets). Additionally, the patient was educated on appropriate frequency, intensity and duration. Handout provided. 2/26 - The following exercises were performed and added to the patient's home exercise program (arabella pumps, quad sets, abdominal isometrics, prone laying). Additionally, the patient was educated on appropriate frequency, intensity and duration to perform. A detailed handout was provided to the patient.     Manual Treatments:    5/8 - brief HS sticking LLE - 3'  4/29 - Gentle manual static hamstring stretch to LLE - 5'  3/12 - prone QUAD stretch; brief assessment of incision and sensitivity near incision - 5'  3/4 - knee flex/ext PROM (full ROM), hip flex to 80deg - 10'  2/26 - consider manual PROM (NO HIP FLEXION WITH KNEE EXTENSION)       Modalities: 2/26 - Consider MOC      Timed Code Treatment Minutes: 60    Total Treatment Minutes: 60     [] EVAL - LOW (08964)   [] EVAL - MOD (12539)  [] EVAL - HIGH (04247)  [] RE-EVAL (84313)  [x] OP(91127) x 1    [] IONTO  [] NMR (26127) x    [] VASO  [] Manual (13888) x      [x] Other: Phys Performance Testing x 1  [x] TA x  2    [] Mech Traction (01044)  [] ES(attended) (30266)      [] ES (un) (39451)  [] Aqua (88095) x   [] Group (12281)  [] Gait training (33287) x      Treatment/Activity Tolerance:  [x] Patient tolerated treatment well [] Patient limited by fatigue  [] Patient limited by pain  [] Patient limited by other medical complications  [x] Other: SEE PN       Prognosis: [x] Good [] Fair  [] Poor      Patient Requires Follow-up: [x] Yes  [] No     Plan:   [x] Continue per plan of care [] Alter current plan (see comments)  [] Plan of care initiated [] Hold pending MD visit [] Discharge      Plan for Next Session: PROGRESS CKC ACTIVITY, HAMSTRING FLEXIBILITY AND STRENGTHENING, SQUATTING MECHANICS, BALANCE AND PROPRIOCEPTION DRILLS, ECCENTRIC CONTROL OF L QUAD AND GLUTE  SEE PROXIMAL HAMSTRING REPAIR REHABILITATION OF DR Ganga Freeman IN [facility]       Electronically signed by:  Yusuf Newton, PT, DPT

## 2019-06-04 ENCOUNTER — OFFICE VISIT (OUTPATIENT)
Dept: ORTHOPEDIC SURGERY | Age: 59
End: 2019-06-04
Payer: COMMERCIAL

## 2019-06-04 VITALS — HEIGHT: 67 IN | BODY MASS INDEX: 35.64 KG/M2 | HEART RATE: 72 BPM | RESPIRATION RATE: 17 BRPM | WEIGHT: 227.07 LBS

## 2019-06-04 DIAGNOSIS — S76.312D COMPLETE RUPTURE OF LEFT PROXIMAL HAMSTRING TENDON, SUBSEQUENT ENCOUNTER: Primary | ICD-10-CM

## 2019-06-04 PROCEDURE — 99213 OFFICE O/P EST LOW 20 MIN: CPT | Performed by: ORTHOPAEDIC SURGERY

## 2019-06-04 ASSESSMENT — ENCOUNTER SYMPTOMS
VOMITING: 0
DIARRHEA: 0
ABDOMINAL DISTENTION: 0
BLOOD IN STOOL: 0
NAUSEA: 0
CONSTIPATION: 0
ABDOMINAL PAIN: 1
SHORTNESS OF BREATH: 0

## 2019-06-04 NOTE — PROGRESS NOTES
History:  Maren Cornelius is here for follow up after left proximal hamstring repair. Surgery date was 2/6/19. Pain is controlled with current analgesics. Medication(s) being used: Percocet. The patient's pain is rated at 0/10. She has been doing PT at [facility]. Physical Examination:  Pulse 72   Resp 17   Ht 5' 7.01\" (1.702 m)   Wt 227 lb 1.2 oz (103 kg)   BMI 35.56 kg/m²     Patient is awake, alert, and in no acute distress. Dorsiflexion / plantarflexion intact bilaterally. SILT bilateral feet. Bilateral feet WWP. Left knee flexion 5-/5. Right knee flexion 5/5. Assessment:   4 months status post left proximal hamstring repair        Plan: Ice prn to ischial tuberosity. Heat prn to hamstring muscle. NSAIDs prn. Continue / finish PT. Follow up in 3 months prn if problems.

## 2019-06-05 ENCOUNTER — HOSPITAL ENCOUNTER (OUTPATIENT)
Dept: PHYSICAL THERAPY | Age: 59
Setting detail: THERAPIES SERIES
Discharge: HOME | End: 2019-06-05
Payer: COMMERCIAL

## 2019-06-05 NOTE — PROGRESS NOTES
Physical Therapy  Cancellation/No-show Note  Patient Name:  Brunilda Zaragoza  :  1960   Date:  2019  Cancelled visits to date: 3  No-shows to date: 0    Patient status for today's appointment patient:  [x]  Cancelled , ,   []  Rescheduled appointment  []  No-show     Reason given by patient:  []  Patient ill  [x]  Conflicting appointment  []  No transportation    [x]  Conflict with work  []  No reason given  []  Other:     Comments:      Phone call information:   []  Phone call made today to patient at _ time at number provided:      []  Patient answered, conversation as follows:    []  Patient did not answer, message left as follows:  [x]  Phone call not made today    Electronically signed by:  Jeffery Owens PT

## 2019-06-06 ENCOUNTER — TELEPHONE (OUTPATIENT)
Dept: INTERNAL MEDICINE CLINIC | Age: 59
End: 2019-06-06

## 2019-06-07 ENCOUNTER — TELEPHONE (OUTPATIENT)
Dept: INTERNAL MEDICINE CLINIC | Age: 59
End: 2019-06-07

## 2019-06-07 NOTE — TELEPHONE ENCOUNTER
Pt called states she was seen 06/03 for abd.pain and no better. Questioning if she should be seen again.

## 2019-06-12 ENCOUNTER — HOSPITAL ENCOUNTER (OUTPATIENT)
Dept: PHYSICAL THERAPY | Age: 59
Setting detail: THERAPIES SERIES
Discharge: HOME | End: 2019-06-12
Payer: COMMERCIAL

## 2019-06-12 PROCEDURE — 97112 NEUROMUSCULAR REEDUCATION: CPT

## 2019-06-12 PROCEDURE — 97110 THERAPEUTIC EXERCISES: CPT

## 2019-06-12 PROCEDURE — 97530 THERAPEUTIC ACTIVITIES: CPT

## 2019-06-12 NOTE — FLOWSHEET NOTE
Physical Therapy Daily Treatment Note  Date:  2019    Patient Name:  Rubina Jones    :  1960  MRN: [id number]     Restrictions/Precautions:  AVOID EXCESSIVE HS STRETCHING OR LOADING HIP JOINT TOO HEAVILY WHEN IN DEEP RANGES OF FLEXION    Medical/Treatment Diagnosis Information:   · Diagnosis: S76.312D - Complete rupture of (L) proximal hamstring tendon, subsequent - 3 OF 4 HAMSTRINGS SURGICALLY REPAIRED ON 19  · Treatment Diagnosis: difficulty walking, L proximal hamstring repair, decreased strength, decreased flexibility, impaired balance      Tracking Information:  Physician Information Referring Practitioner: Marquita Woodson MD     Plan of Care Sent Date:  Signed Received: Y   Visit Count / Total Visits   +  +     Insurance Approved Visits    Approved Dates:     Insurance Information PT Insurance Information: ANTHEM - 80/20 - 11Z PCY then precert     Progress Note/G-codes   []  Yes  [x]  No Next Due: #30     Pain level: 0/10     Subjective: The patient denies any increased soreness/tenderness/pains. Objective:      Observation:   - Pt demos decreased ankle mobility bilaterally when performing back squats with Earthquake bar.   - Pt demos improved control and confidence with step overs of 8'' box.  - cues required to avoid trunk rotation w/ qped activity   - patient able to perform step up/over fwd & retro using LLE in pool  3/19 - Pt demos incorrect motor pattern with mini-squats as evidenced by the inability to maintain upright chest position and the knees traveling too far anterior over the toes.   3/22 - patient able to ambulate WBAT in pool    3/12 - incision healing without signs of infection/irritation; small 1cm x 1cm scab formed over middle of incision; patient's skin hypersensitive in area of incision  3/4 - proper form with standing exercises   Test measurements:  6/3/19 - Isokinetic Testing / Bilateral Difference:  Quadricep 180 patient regarding return to work guidelines, and response needed from [employer] regarding disability benefits claim. Patient and PT discussion regarding HEP performance, as well as update of this program (please see below). - 60'  3/29 - Gait training was performed on this date w/o AD in order to increase gait speed, normalize arm swing, and improve heel strike/push-off with the LLE. Tactile and verbal cues were used in order to normalize gait mechanics. Patient demo'd good carryover at end of session. - 12'  3/26 - PT adjusted brace per MD recommendations (hip unlocked fully). Patient educated on exercising continued caution when out of brace. 3/22 - patient educated on WBAT, progression of WB, POC, scheduling aqua/dryland, and precautions at this time. Patient was given tour of pool, instructed on use of lockers and shown PT aquatic equipment. 2/26 - Patient educated on the focus of skilled physical therapy services and plan of care, including: diagnosis, prognosis, treatment goals & options. Patient educated to continue to wear brace at night with sleeping; encouraged patient to continue to try to sit normally. Patient encouraged/educated to maintain touch down weight bearing at this time, and was educated on reducing axillary compression with B crutches. Home Exercise Program:    4/29 - Patient encouraged to continue with current HEP on a daily basis until her next PT appointment. Patient instructed to progress HEP exercises as she feels necessary through increasing number of sets and repetitions. Patient will avoid using resistance for HEP exercises until initiating more aggressive strengthening in the clinic under supervision of PT.  4/16 - verbally discussed and recommended standing quad stretch after proper performance in clinic.   4/12 - The following exercises were discussed and added to the patient's home exercise program. (supine SLR flex, qped glute kicks, qped LE extension, standing HF stretch, qped hip abd+ER). Additionally, the patient was educated on appropriate frequency, intensity and duration. Handout provided. Patient's previous HEP sheets were edited and highlighted to remove more simple exercises (from 2/26 and portions of 3/4), and to progress resistive exercises. Patient's questions were answered. (time included in 'other activities' above). 4/9 - green TB provided for standing SLR 3 way. 4/5 - The following exercises were performed and added to the patient's home exercise program. (SKTC, piriformis S, SB bridges, SB straight leg bridges, SB HS Curls, SB reverse crunch). Additionally, the patient was educated on appropriate frequency, intensity and duration. Handout provided. 3/29 - Patient instructed to continue gentle HSS and gasroc stretch. HF stretch not appropriate at this time as patient demos functional hip extension ROM and does not feel stretch with such activity. 3/26 - The following exercises were performed and added to the patient's home exercise program. (HSS GENTLE, gastroc S). Additionally, the patient was educated on appropriate frequency, intensity and duration. Handout provided. 3/12 - The following exercises were performed and added to the patient's home exercise program. (SLR abd, sidelying clamshells, adductor isometrics, prone knee flexion [pain free], prone SLR ext [pain free]), Additionally, the patient was educated on appropriate frequency, intensity and duration. Patient heavily edu'd to avoid pain, even slightly with prone knee flexion or prone SLR ext.  3/4 - The following exercises were performed and added to the patient's home exercise program. (SLR abd, SLR ext (in standing), standing TKE, glute sets). Additionally, the patient was educated on appropriate frequency, intensity and duration. Handout provided. 2/26 - The following exercises were performed and added to the patient's home exercise program (arabella pumps, quad sets, abdominal isometrics, prone laying). Additionally, the patient was educated on appropriate frequency, intensity and duration to perform. A detailed handout was provided to the patient. Manual Treatments:    5/8 - brief HS sticking LLE - 3'  4/29 - Gentle manual static hamstring stretch to LLE - 5'  3/12 - prone QUAD stretch; brief assessment of incision and sensitivity near incision - 5'  3/4 - knee flex/ext PROM (full ROM), hip flex to 80deg - 10'  2/26 - consider manual PROM (NO HIP FLEXION WITH KNEE EXTENSION)       Modalities: 2/26 - Consider MOC      Timed Code Treatment Minutes: 40    Total Treatment Minutes: 40     [] EVAL - LOW (83279)   [] EVAL - MOD (72647)  [] EVAL - HIGH (83077)  [] RE-EVAL (23446)  [x] SE(37691) x 1    [] IONTO  [x] NMR (75510) x 1    [] VASO  [] Manual (61129) x      [] Other: Phys Performance Testing x   [x] TA x  1    [] Mech Traction (35371)  [] ES(attended) (60813)      [] ES (un) (94798)  [] Aqua (97039) x   [] Group (38024)  [] Gait training (96425) x      Treatment/Activity Tolerance:  [x] Patient tolerated treatment well [] Patient limited by fatigue  [] Patient limited by pain  [] Patient limited by other medical complications  [x] Other: The patient required demo/cues for all plyometrics and SKIP-related activity, due to first time, but performed well without pain. The patient is progressing into plyometrics and continued PRE well.        Prognosis: [x] Good [] Fair  [] Poor      Patient Requires Follow-up: [x] Yes  [] No     Plan:   [x] Continue per plan of care [] Alter current plan (see comments)  [] Plan of care initiated [] Hold pending MD visit [] Discharge      Plan for Next Session: PROGRESS CKC ACTIVITY, HAMSTRING FLEXIBILITY AND STRENGTHENING, SQUATTING MECHANICS, BALANCE AND PROPRIOCEPTION DRILLS, ECCENTRIC CONTROL OF L QUAD AND GLUTE  SEE PROXIMAL HAMSTRING REPAIR REHABILITATION OF DR CASSIA BARBOZA IN [facility] , [address] [address] [address]     Electronically signed by:  Mark Duarte, PT, DPT

## 2019-06-13 ENCOUNTER — OFFICE VISIT (OUTPATIENT)
Dept: INTERNAL MEDICINE CLINIC | Age: 59
End: 2019-06-13
Payer: COMMERCIAL

## 2019-06-13 ENCOUNTER — HOSPITAL ENCOUNTER (OUTPATIENT)
Dept: CT IMAGING | Age: 59
Discharge: HOME | End: 2019-06-13
Payer: COMMERCIAL

## 2019-06-13 VITALS
DIASTOLIC BLOOD PRESSURE: 84 MMHG | BODY MASS INDEX: 36.1 KG/M2 | SYSTOLIC BLOOD PRESSURE: 132 MMHG | HEART RATE: 76 BPM | HEIGHT: 67 IN | WEIGHT: 230 LBS

## 2019-06-13 DIAGNOSIS — R11.2 NAUSEA AND VOMITING, INTRACTABILITY OF VOMITING NOT SPECIFIED, UNSPECIFIED VOMITING TYPE: ICD-10-CM

## 2019-06-13 DIAGNOSIS — R10.12 LUQ PAIN: ICD-10-CM

## 2019-06-13 DIAGNOSIS — K85.90 ACUTE PANCREATITIS, UNSPECIFIED COMPLICATION STATUS, UNSPECIFIED PANCREATITIS TYPE: ICD-10-CM

## 2019-06-13 DIAGNOSIS — K85.90 ACUTE PANCREATITIS, UNSPECIFIED COMPLICATION STATUS, UNSPECIFIED PANCREATITIS TYPE: Primary | ICD-10-CM

## 2019-06-13 LAB
A/G RATIO: 1.5 (ref 1.1–2.2)
ALBUMIN SERPL-MCNC: 4.8 G/DL (ref 3.4–5)
ALP BLD-CCNC: 99 U/L (ref 40–129)
ALT SERPL-CCNC: 20 U/L (ref 10–40)
ANION GAP SERPL CALCULATED.3IONS-SCNC: 11 MMOL/L (ref 3–16)
AST SERPL-CCNC: 23 U/L (ref 15–37)
BASOPHILS ABSOLUTE: 0 K/UL (ref 0–0.2)
BASOPHILS RELATIVE PERCENT: 0.4 %
BILIRUB SERPL-MCNC: <0.2 MG/DL (ref 0–1)
BUN BLDV-MCNC: 15 MG/DL (ref 7–20)
CALCIUM SERPL-MCNC: 10.8 MG/DL (ref 8.3–10.6)
CHLORIDE BLD-SCNC: 104 MMOL/L (ref 99–110)
CO2: 28 MMOL/L (ref 21–32)
CREAT SERPL-MCNC: 0.8 MG/DL (ref 0.6–1.1)
EOSINOPHILS ABSOLUTE: 0.3 K/UL (ref 0–0.6)
EOSINOPHILS RELATIVE PERCENT: 3.9 %
GFR AFRICAN AMERICAN: >60
GFR NON-AFRICAN AMERICAN: >60
GLOBULIN: 3.2 G/DL
GLUCOSE BLD-MCNC: 94 MG/DL (ref 70–99)
HCT VFR BLD CALC: 44.4 % (ref 36–48)
HEMOGLOBIN: 14.8 G/DL (ref 12–16)
LIPASE: 29 U/L (ref 13–60)
LYMPHOCYTES ABSOLUTE: 3 K/UL (ref 1–5.1)
LYMPHOCYTES RELATIVE PERCENT: 39.3 %
MCH RBC QN AUTO: 29.5 PG (ref 26–34)
MCHC RBC AUTO-ENTMCNC: 33.3 G/DL (ref 31–36)
MCV RBC AUTO: 88.6 FL (ref 80–100)
MONOCYTES ABSOLUTE: 0.7 K/UL (ref 0–1.3)
MONOCYTES RELATIVE PERCENT: 9.3 %
NEUTROPHILS ABSOLUTE: 3.6 K/UL (ref 1.7–7.7)
NEUTROPHILS RELATIVE PERCENT: 47.1 %
PDW BLD-RTO: 15 % (ref 12.4–15.4)
PLATELET # BLD: 282 K/UL (ref 135–450)
PMV BLD AUTO: 7.9 FL (ref 5–10.5)
POTASSIUM SERPL-SCNC: 4.1 MMOL/L (ref 3.5–5.1)
RBC # BLD: 5.01 M/UL (ref 4–5.2)
SODIUM BLD-SCNC: 143 MMOL/L (ref 136–145)
TOTAL PROTEIN: 8 G/DL (ref 6.4–8.2)
WBC # BLD: 7.6 K/UL (ref 4–11)

## 2019-06-13 PROCEDURE — 83690 ASSAY OF LIPASE: CPT

## 2019-06-13 PROCEDURE — 80053 COMPREHEN METABOLIC PANEL: CPT

## 2019-06-13 PROCEDURE — 6360000004 HC RX CONTRAST MEDICATION: Performed by: NURSE PRACTITIONER

## 2019-06-13 PROCEDURE — 74177 CT ABD & PELVIS W/CONTRAST: CPT

## 2019-06-13 PROCEDURE — 85025 COMPLETE CBC W/AUTO DIFF WBC: CPT

## 2019-06-13 PROCEDURE — 99213 OFFICE O/P EST LOW 20 MIN: CPT | Performed by: NURSE PRACTITIONER

## 2019-06-13 PROCEDURE — 36415 COLL VENOUS BLD VENIPUNCTURE: CPT

## 2019-06-13 RX ORDER — HYDROCODONE BITARTRATE AND ACETAMINOPHEN 5; 325 MG/1; MG/1
1 TABLET ORAL EVERY 4 HOURS PRN
Qty: 20 TABLET | Refills: 0 | Status: SHIPPED | OUTPATIENT
Start: 2019-06-13 | End: 2019-06-20

## 2019-06-13 RX ADMIN — IOPAMIDOL 75 ML: 755 INJECTION, SOLUTION INTRAVENOUS at 15:54

## 2019-06-13 RX ADMIN — IOHEXOL 50 ML: 240 INJECTION, SOLUTION INTRATHECAL; INTRAVASCULAR; INTRAVENOUS; ORAL at 15:51

## 2019-06-13 ASSESSMENT — ENCOUNTER SYMPTOMS
ABDOMINAL PAIN: 1
DIARRHEA: 0
BLOOD IN STOOL: 0
CONSTIPATION: 0
VOMITING: 0
NAUSEA: 1

## 2019-06-13 NOTE — PROGRESS NOTES
SUBJECTIVE:    Patient ID: Mio Bo is a 63 y.o. female. CC: Abdominal pain    HPI: Patient presents to the office today for short follow-up with abdominal pain. She was seen in the office about 10 days ago with complaint of lower extremity swelling. She also mentioned abdominal pain at that time. Lab tests performed were unremarkable other than an elevated lipase at 132. Patient was advised to follow a clear liquid diet and follow-up. Today, she reports continued intermittent left upper quadrant pain which can be severe. There does not seem to be a food component. She has nausea associated with the pain but no vomiting. She denies any fevers. She denies any bloody stools. Past Medical History:   Diagnosis Date    Allergic rhinitis     med records    Diabetes mellitus ([facility] [address]     Dyslipidemia     Hemorrhoids     History of recurrent UTIs     Hyperlipidemia     Hypertension     med records    Microalbuminuria     med records    Migraine with aura         Current Outpatient Medications   Medication Sig Dispense Refill    HYDROcodone-acetaminophen (NORCO) 5-325 MG per tablet Take 1 tablet by mouth every 4 hours as needed for Pain for up to 7 days. Intended supply: 5 days. Take lowest dose possible to manage pain 20 tablet 0    cetirizine (ZYRTEC) 10 MG tablet TAKE 1 TABLET BY MOUTH DAILY AT BEDTIME 90 tablet 1    metFORMIN (GLUCOPHAGE) 1000 MG tablet Take 1 tablet by mouth 2 times daily      pravastatin (PRAVACHOL) 20 MG tablet Take 20 mg by mouth daily NOT TAKING      fluticasone (FLONASE) 50 MCG/ACT nasal spray 2 sprays by Nasal route daily       azelastine HCl 0.15 % SOLN SPRAY TWICE INTO EACH NOSTRIL BID PRF ALLERGY SYMPTOMS  1     No current facility-administered medications for this visit. Review of Systems   Constitutional: Negative for fever. Gastrointestinal: Positive for abdominal pain and nausea.  Negative for blood in stool, constipation, diarrhea and vomiting. Skin: Negative. OBJECTIVE:  Physical Exam   Constitutional: She is oriented to person, place, and time. She appears well-developed and well-nourished. No distress. HENT:   Head: Normocephalic and atraumatic. Abdominal: Soft. Bowel sounds are normal. There is tenderness in the left upper quadrant. There is guarding. There is no rigidity. Neurological: She is alert and oriented to person, place, and time. Skin: Skin is warm and dry. She is not diaphoretic. /84   Pulse 76   Ht 5' 7\" (1.702 m)   Wt 230 lb (104.3 kg)   BMI 36.02 kg/m²      PHQ Scores 6/3/2019 8/7/2018   PHQ2 Score 0 0   PHQ9 Score 0 0     Interpretation of Total Score Depression Severity: 1-4 = Minimal depression, 5-9 = Mild depression, 10-14 = Moderate depression, 15-19 = Moderately severe depression, 20-27 =Severe depression        ASSESSMENT/PLAN:  Krista Fields was seen today for abdominal pain and headache. Diagnoses and all orders for this visit:    Acute pancreatitis, unspecified complication status, unspecified pancreatitis type  -     CT ABDOMEN PELVIS W IV CONTRAST Additional Contrast? Radiologist Recommendation  -     Comprehensive Metabolic Panel  -     CBC WITH AUTO DIFFERENTIAL  -     LIPASE  -     HYDROcodone-acetaminophen (NORCO) 5-325 MG per tablet; Take 1 tablet by mouth every 4 hours as needed for Pain for up to 7 days. Intended supply: 5 days.  Take lowest dose possible to manage pain    LUQ pain  -     CT ABDOMEN PELVIS W IV CONTRAST Additional Contrast? Radiologist Recommendation  -     Comprehensive Metabolic Panel  -     CBC WITH AUTO DIFFERENTIAL  -     LIPASE    Nausea and vomiting, intractability of vomiting not specified, unspecified vomiting type  -     CT ABDOMEN PELVIS W IV CONTRAST Additional Contrast? Radiologist Recommendation  -     Comprehensive Metabolic Panel  -     CBC WITH AUTO DIFFERENTIAL  -     LIPASE    - Elevated lipase with LUQ pain  - No improvement with bowel

## 2019-06-14 ENCOUNTER — HOSPITAL ENCOUNTER (OUTPATIENT)
Dept: PHYSICAL THERAPY | Age: 59
Setting detail: THERAPIES SERIES
Discharge: HOME | End: 2019-06-14
Payer: COMMERCIAL

## 2019-06-14 PROCEDURE — 97530 THERAPEUTIC ACTIVITIES: CPT

## 2019-06-14 PROCEDURE — 97112 NEUROMUSCULAR REEDUCATION: CPT

## 2019-06-14 PROCEDURE — 97110 THERAPEUTIC EXERCISES: CPT

## 2019-06-14 NOTE — FLOWSHEET NOTE
Physical Therapy Daily Treatment Note  Date:  2019    Patient Name:  Brunilda Zaragoza    :  1960  MRN: [id number]     Restrictions/Precautions:  AVOID EXCESSIVE HS STRETCHING OR LOADING HIP JOINT TOO HEAVILY WHEN IN DEEP RANGES OF FLEXION    Medical/Treatment Diagnosis Information:   · Diagnosis: S76.312D - Complete rupture of (L) proximal hamstring tendon, subsequent - 3 OF 4 HAMSTRINGS SURGICALLY REPAIRED ON 19  · Treatment Diagnosis: difficulty walking, L proximal hamstring repair, decreased strength, decreased flexibility, impaired balance      Tracking Information:  Physician Information Referring Practitioner: Marylen Amato, MD     Plan of Care Sent Date:  Signed Received: Y   Visit Count / Total Visits   + 10/12 +     Insurance Approved Visits    Approved Dates:     Insurance Information PT Insurance Information: ANTHEM - 80/20 - 10Y PCY then precert     Progress Note/G-codes   []  Yes  [x]  No Next Due: #30     Pain level: 0/10     Subjective: The patient denies any increased soreness/tenderness/pains. Objective:      Observation:   - Pt demos decreased ankle mobility bilaterally when performing back squats with Earthquake bar.   - Pt demos improved control and confidence with step overs of 8'' box.  - cues required to avoid trunk rotation w/ qped activity   - patient able to perform step up/over fwd & retro using LLE in pool  3/19 - Pt demos incorrect motor pattern with mini-squats as evidenced by the inability to maintain upright chest position and the knees traveling too far anterior over the toes.   3/22 - patient able to ambulate WBAT in pool    3/12 - incision healing without signs of infection/irritation; small 1cm x 1cm scab formed over middle of incision; patient's skin hypersensitive in area of incision  3/4 - proper form with standing exercises   Test measurements:  6/3/19 - Isokinetic Testing / Bilateral Difference:  Quadricep 180 Added 4/29:  Patient requires significant VC/TC for correct technique and sequencing   TRX Full Lunges      TRX Squat + Heel Crush & DF    TRX Squats    Rebounder Tosses     TM Mush     TM Amb  Warm-Up     Upright Bike     Standing Quad Stretch     Lateral band walks     Stair Ambulation  (w/o handrail)    3-point excursion     Sport Cord        Cybex Leg Press        Quantum Knee Ext      Quantum Knee Curl              L4, B2          Knee C, Ankle 2        Knee 3: Ankle 2; 1 set adducted w/ DL   Forward step-ups on BOSU with sustained SLS     6'' Forward/RetroStep Overs    8'' Forward/Retro Step Overs     TG Glute Press in Qped     TG Sidelying Single Leg Press     Spine Neutral:  Hip Hinges    Squats    Edu       SLS progression          Dynamic Warm Up >Heel Walk  >Toe Walk  >Quad Walk   >Fig 4 Walk  >Lateral Squat    >Helicopter 1/2 lunges  >Lateral Band Walk - blue (laces)  >Dynamic Hamstring    > Deadlift Walk      Hip Abduction Machine    Hip Adduction        FM squat     Y balance SLS MaMax     SLR Adduction     Stairs Up/Down     KB Squats (to 6'' step)  <<cued for focus on hamstring/gluteal attachment site   Split Squat    Lunges     Single leg deadlift -   <<fatigue set in, LLE shake noted   Ladders    [employer]    Box Hops    Sport Cord (doubled - facing away from anchor)    Bent Over HS Curls      Bent Over SLR Extension    Standing Lateral Kicks    Running Man    SKIP W1D2  (20sec modification throughout) Wall Taps  Squat Jumps  Tuck Jumps  180 jumps  Barrier Jumps  (lateral)  Broad Jumps - 5x  6'' box jump - 3 x 5  Single leg 2'' 5x R/L (lateral/medial EACH)   Form Drills 1 lap - 40ft x 2    High knees  Heel to butt  A skips  B skips                         Other Activities:    6/3 - The patient was provided an assessment including evaluative tests and measures, as well as documentation to track progress. The patient was physical performance tested with isokinetic [employer] equipment.  - 39'  4/12 - The patient was provided an assessment including evaluative & functional tests and measures, as well as documentation to track progress. Extensive discussion with patient regarding return to work guidelines, and response needed from [employer] regarding disability benefits claim. Patient and PT discussion regarding HEP performance, as well as update of this program (please see below). - 60'  3/29 - Gait training was performed on this date w/o AD in order to increase gait speed, normalize arm swing, and improve heel strike/push-off with the LLE. Tactile and verbal cues were used in order to normalize gait mechanics. Patient demo'd good carryover at end of session. - 12'  3/26 - PT adjusted brace per MD recommendations (hip unlocked fully). Patient educated on exercising continued caution when out of brace. 3/22 - patient educated on WBAT, progression of WB, POC, scheduling aqua/dryland, and precautions at this time. Patient was given tour of pool, instructed on use of lockers and shown PT aquatic equipment. 2/26 - Patient educated on the focus of skilled physical therapy services and plan of care, including: diagnosis, prognosis, treatment goals & options. Patient educated to continue to wear brace at night with sleeping; encouraged patient to continue to try to sit normally. Patient encouraged/educated to maintain touch down weight bearing at this time, and was educated on reducing axillary compression with B crutches. Home Exercise Program:    4/29 - Patient encouraged to continue with current HEP on a daily basis until her next PT appointment. Patient instructed to progress HEP exercises as she feels necessary through increasing number of sets and repetitions. Patient will avoid using resistance for HEP exercises until initiating more aggressive strengthening in the clinic under supervision of PT.  4/16 - verbally discussed and recommended standing quad stretch after proper performance in clinic.   4/12 - The following exercises were discussed and added to the patient's home exercise program. (supine SLR flex, qped glute kicks, qped LE extension, standing HF stretch, qped hip abd+ER). Additionally, the patient was educated on appropriate frequency, intensity and duration. Handout provided. Patient's previous HEP sheets were edited and highlighted to remove more simple exercises (from 2/26 and portions of 3/4), and to progress resistive exercises. Patient's questions were answered. (time included in 'other activities' above). 4/9 - green TB provided for standing SLR 3 way. 4/5 - The following exercises were performed and added to the patient's home exercise program. (SKTC, piriformis S, SB bridges, SB straight leg bridges, SB HS Curls, SB reverse crunch). Additionally, the patient was educated on appropriate frequency, intensity and duration. Handout provided. 3/29 - Patient instructed to continue gentle HSS and gasroc stretch. HF stretch not appropriate at this time as patient demos functional hip extension ROM and does not feel stretch with such activity. 3/26 - The following exercises were performed and added to the patient's home exercise program. (HSS GENTLE, gastroc S). Additionally, the patient was educated on appropriate frequency, intensity and duration. Handout provided. 3/12 - The following exercises were performed and added to the patient's home exercise program. (SLR abd, sidelying clamshells, adductor isometrics, prone knee flexion [pain free], prone SLR ext [pain free]), Additionally, the patient was educated on appropriate frequency, intensity and duration. Patient heavily edu'd to avoid pain, even slightly with prone knee flexion or prone SLR ext.  3/4 - The following exercises were performed and added to the patient's home exercise program. (SLR abd, SLR ext (in standing), standing TKE, glute sets). Additionally, the patient was educated on appropriate frequency, intensity and duration.  Handout provided. 2/26 - The following exercises were performed and added to the patient's home exercise program (arabella pumps, quad sets, abdominal isometrics, prone laying). Additionally, the patient was educated on appropriate frequency, intensity and duration to perform. A detailed handout was provided to the patient. Manual Treatments:    5/8 - brief HS sticking LLE - 3'  4/29 - Gentle manual static hamstring stretch to LLE - 5'  3/12 - prone QUAD stretch; brief assessment of incision and sensitivity near incision - 5'  3/4 - knee flex/ext PROM (full ROM), hip flex to 80deg - 10'  2/26 - consider manual PROM (NO HIP FLEXION WITH KNEE EXTENSION)       Modalities: 2/26 - Consider MOC      Timed Code Treatment Minutes: 40    Total Treatment Minutes: 40     [] EVAL - LOW (43794)   [] EVAL - MOD (77234)  [] EVAL - HIGH (55615)  [] RE-EVAL (62651)  [x] QA(79065) x 1    [] IONTO  [x] NMR (22948) x 1    [] VASO  [] Manual (80846) x      [] Other: Phys Performance Testing x   [x] TA x  1    [] Mech Traction (69536)  [] ES(attended) (14850)      [] ES (un) (37084)  [] Aqua (31197) x   [] Group (88379)  [] Gait training (67017) x      Treatment/Activity Tolerance:  [x] Patient tolerated treatment well [] Patient limited by fatigue  [] Patient limited by pain  [] Patient limited by other medical complications  [x] Other: The patient had increased fatigue and HR with plyometrics but performed well without pain. The patient is progressing into plyometrics and continued PRE well at this time.        Prognosis: [x] Good [] Fair  [] Poor      Patient Requires Follow-up: [x] Yes  [] No     Plan:   [x] Continue per plan of care [] Alter current plan (see comments)  [] Plan of care initiated [] Hold pending MD visit [] Discharge      Plan for Next Session: PROGRESS CKC ACTIVITY, HAMSTRING FLEXIBILITY AND STRENGTHENING, SQUATTING MECHANICS, BALANCE AND PROPRIOCEPTION DRILLS, ECCENTRIC CONTROL OF L QUAD AND GLUTE  SEE PROXIMAL HAMSTRING REPAIR REHABILITATION OF DR Oral Mari IN [address] , SKIP WEEK 2 D1     Electronically signed by:  Mark Duarte, PT, DPT

## 2019-06-18 ENCOUNTER — HOSPITAL ENCOUNTER (OUTPATIENT)
Dept: PHYSICAL THERAPY | Age: 59
Setting detail: THERAPIES SERIES
Discharge: HOME | End: 2019-06-18
Payer: COMMERCIAL

## 2019-06-18 PROCEDURE — 97112 NEUROMUSCULAR REEDUCATION: CPT

## 2019-06-18 PROCEDURE — 97530 THERAPEUTIC ACTIVITIES: CPT

## 2019-06-18 PROCEDURE — 97150 GROUP THERAPEUTIC PROCEDURES: CPT

## 2019-06-18 NOTE — FLOWSHEET NOTE
Physical Therapy Daily Treatment Note  Date:  2019    Patient Name:  Bertram Conde    :  1960  MRN: [id number]     Restrictions/Precautions:  AVOID EXCESSIVE HS STRETCHING OR LOADING HIP JOINT TOO HEAVILY WHEN IN DEEP RANGES OF FLEXION    Medical/Treatment Diagnosis Information:   · Diagnosis: S76.312D - Complete rupture of (L) proximal hamstring tendon, subsequent - 3 OF 4 HAMSTRINGS SURGICALLY REPAIRED ON 19  · Treatment Diagnosis: difficulty walking, L proximal hamstring repair, decreased strength, decreased flexibility, impaired balance      Tracking Information:  Physician Information Referring Practitioner: Ava Weinberg MD     Plan of Care Sent Date:  Signed Received: Y   Visit Count / Total Visits   +  +     Insurance Approved Visits    Approved Dates:     Insurance Information PT Insurance Information: ANTHEM - 80/20 - 97R PCY then precert     Progress Note/G-codes   []  Yes  [x]  No Next Due: #30     Pain level: 0/10     Subjective: The patient reports bodily stiffness, but no increases of pain or other sxs. Objective:      Observation:   - Pt demos decreased ankle mobility bilaterally when performing back squats with Earthquake bar.   - Pt demos improved control and confidence with step overs of 8'' box.  - cues required to avoid trunk rotation w/ qped activity   - patient able to perform step up/over fwd & retro using LLE in pool  3/19 - Pt demos incorrect motor pattern with mini-squats as evidenced by the inability to maintain upright chest position and the knees traveling too far anterior over the toes.   3/22 - patient able to ambulate WBAT in pool    3/12 - incision healing without signs of infection/irritation; small 1cm x 1cm scab formed over middle of incision; patient's skin hypersensitive in area of incision  3/4 - proper form with standing exercises   Test measurements:  6/3/19 - Isokinetic Testing / Bilateral Squats with Earthquake bar   Added 4/29:  Patient requires significant VC/TC for correct technique and sequencing   TRX Full Lunges      TRX Squat + Heel Crush & DF    TRX Squats    Rebounder Tosses     TM Mush     TM Amb  Warm-Up     Upright Bike     Standing Quad Stretch     Lateral band walks     Stair Ambulation  (w/o handrail)    3-point excursion     Sport Cord        Cybex Leg Press        Quantum Knee Ext          Quantum Knee Curl   180# 3 x 10 DL  50# 3 x 10 DL          50# 3 x 10 DL   L4, B2          Knee C, Ankle 2: 1 set neutral, 1 set adducted, 1 set DF'd    Knee 3: Ankle 2; 1 set adducted w/ DL, 1 set DF'd   Forward step-ups on BOSU with sustained SLS     6'' Forward/RetroStep Overs    8'' Forward/Retro Step Overs     TG Glute Press in Qped     TG Sidelying Single Leg Press     Spine Neutral:  Hip Hinges    Squats    Edu       SLS progression          Dynamic Warm Up >Heel Walk  >Toe Walk  >Quad Walk   >Fig 4 Walk  >Lateral Squat    >Helicopter 1/2 lunges  >Lateral Band Walk - blue (laces)  >Dynamic Hamstring    > Deadlift Walk      Hip Abduction Machine    Hip Adduction        FM squat     Y balance SLS MaMax     SLR Adduction     Stairs Up/Down     KB Squats (to 6'' step)  <<cued for focus on hamstring/gluteal attachment site   Split Squat    Lunges     Single leg deadlift -   <<fatigue set in, LLE shake noted   Ladders    [employer]    Box Hops    Sport Cord (doubled - facing away from anchor)    Bent Over HS Curls      Bent Over SLR Extension    Standing Lateral Kicks    Running Man    SKIP W2D1  (25sec modification throughout) Wall Taps  Squat Jumps  Tuck Jumps  180 jumps  Barrier Jumps (Fwd/Retro)   Broad Jumps - 5x  8'' box jump - 3 x 5  Single leg 2'' 5x R/L (forward EACH)   Form Drills                          Other Activities:    6/3 - The patient was provided an assessment including evaluative tests and measures, as well as documentation to track progress.  The patient was physical performance tested with isokinetic [employer] equipment. - 39' 4/12 - The patient was provided an assessment including evaluative & functional tests and measures, as well as documentation to track progress. Extensive discussion with patient regarding return to work guidelines, and response needed from [employer] regarding disability benefits claim. Patient and PT discussion regarding HEP performance, as well as update of this program (please see below). - 60'  3/29 - Gait training was performed on this date w/o AD in order to increase gait speed, normalize arm swing, and improve heel strike/push-off with the LLE. Tactile and verbal cues were used in order to normalize gait mechanics. Patient demo'd good carryover at end of session. - 12'  3/26 - PT adjusted brace per MD recommendations (hip unlocked fully). Patient educated on exercising continued caution when out of brace. 3/22 - patient educated on WBAT, progression of WB, POC, scheduling aqua/dryland, and precautions at this time. Patient was given tour of pool, instructed on use of lockers and shown PT aquatic equipment. 2/26 - Patient educated on the focus of skilled physical therapy services and plan of care, including: diagnosis, prognosis, treatment goals & options. Patient educated to continue to wear brace at night with sleeping; encouraged patient to continue to try to sit normally. Patient encouraged/educated to maintain touch down weight bearing at this time, and was educated on reducing axillary compression with B crutches. Home Exercise Program:    4/29 - Patient encouraged to continue with current HEP on a daily basis until her next PT appointment. Patient instructed to progress HEP exercises as she feels necessary through increasing number of sets and repetitions.  Patient will avoid using resistance for HEP exercises until initiating more aggressive strengthening in the clinic under supervision of PT.  4/16 - verbally discussed and recommended standing quad stretch after proper performance in clinic. 4/12 - The following exercises were discussed and added to the patient's home exercise program. (supine SLR flex, qped glute kicks, qped LE extension, standing HF stretch, qped hip abd+ER). Additionally, the patient was educated on appropriate frequency, intensity and duration. Handout provided. Patient's previous HEP sheets were edited and highlighted to remove more simple exercises (from 2/26 and portions of 3/4), and to progress resistive exercises. Patient's questions were answered. (time included in 'other activities' above). 4/9 - green TB provided for standing SLR 3 way. 4/5 - The following exercises were performed and added to the patient's home exercise program. (SKTC, piriformis S, SB bridges, SB straight leg bridges, SB HS Curls, SB reverse crunch). Additionally, the patient was educated on appropriate frequency, intensity and duration. Handout provided. 3/29 - Patient instructed to continue gentle HSS and gasroc stretch. HF stretch not appropriate at this time as patient demos functional hip extension ROM and does not feel stretch with such activity. 3/26 - The following exercises were performed and added to the patient's home exercise program. (HSS GENTLE, gastroc S). Additionally, the patient was educated on appropriate frequency, intensity and duration. Handout provided. 3/12 - The following exercises were performed and added to the patient's home exercise program. (SLR abd, sidelying clamshells, adductor isometrics, prone knee flexion [pain free], prone SLR ext [pain free]), Additionally, the patient was educated on appropriate frequency, intensity and duration. Patient heavily edu'd to avoid pain, even slightly with prone knee flexion or prone SLR ext.  3/4 - The following exercises were performed and added to the patient's home exercise program. (SLR abd, SLR ext (in standing), standing TKE, glute sets).   Additionally, the patient was REHABILITATION OF DR Manohar Alonso [address] , [facility] WEEK 2 D2     Electronically signed by:  Sagar Hawley, PT, DPT

## 2019-06-21 ENCOUNTER — HOSPITAL ENCOUNTER (OUTPATIENT)
Dept: PHYSICAL THERAPY | Age: 59
Setting detail: THERAPIES SERIES
Discharge: HOME | End: 2019-06-21
Payer: COMMERCIAL

## 2019-06-21 PROCEDURE — 97530 THERAPEUTIC ACTIVITIES: CPT

## 2019-06-21 PROCEDURE — 97112 NEUROMUSCULAR REEDUCATION: CPT

## 2019-06-21 PROCEDURE — 97110 THERAPEUTIC EXERCISES: CPT

## 2019-06-21 NOTE — FLOWSHEET NOTE
formed over middle of incision; patient's skin hypersensitive in area of incision  3/4 - proper form with standing exercises   Test measurements:  6/3/19 - Isokinetic Testing / Bilateral Difference:  Quadricep 180 deg/sec: 34.5%   [x] Surplus 300 deg/sec: 4.4% [] Deficit   [x] Surplus   Hamstring 180 deg/sec: 17.9% [x] Deficit   [] Surplus 300 deg/sec: 16.9% [x] Deficit   [] Surplus   5/13 - Prone HS MMT: 15. 4#L, 23. 7#R, Seated HS MMT: 17. 3#L, 20. 3#R  4/29 - Left Hamstring 90/90 flexibility = 65deg  4/24 - Left Hamstring 90/90 flexibility = 58deg   4/5 - Left Hamstring 90/90 flexibility = 40deg     Exercises:  Exercise/Equipment Resistance/Repetitions Other comments   Seated quad set (approx 50deg knee flex)    Ankle pumps    Abdominal isometric    Prone Laying    CC Standing 3-way SLR into flex, ext, abd   4/9: CC + resistance   Standing TKE (Touchdown WB on LLE) <<standing at table   Seated Glute Sets    Standing Glute Set << standing at table    Standing L Hip marches (w/ NO knee extension) <<standing at table   Hooklying Adductor Isometric    R sidelying Clamshells    R sidelying SLR abd    Prone SLR Ext << patient heavily edu'd to avoid/stop if even slightly pained   Prone Knee Flexion L <<ankle DF'd to prevent hamstring stretch sensation; patient heavily edu'd to avoid/stop if even slightly pained   Weight Shifting into Alternating Marches    Double leg bridges    Mini-squats w/ emphasis on glute squeeze at top    3/29 - Significant cueing for correct sequencing and technique   Gait Training   3/29 - Verbal and tactile cues to increase lacy, improve arm swing, improve heel strike and push-off with LLE   Standing hamstring curl    // bars            Equilla Laboy    Patient Edu    Supine SKTC    Supine Fig 4 Piriformis S    SB Bridges    SB Straight Leg Bridges    Prone HS Curls    SB HS Curls    SB Reverse Crunch    Upright Bike    IB Gastroc    Standing Adductor S    IB HSS 2 x 60'' L   TG SLR Abd    TG Squats + Hip Abd TB Loop    TG Squats    Qped Birddog 4/22 - Cues used in order to decrease pelvic rotation   Lateral Heel Taps      Prone Glute Kick w/ Knee Flexed @ 80    Rockerboard    Squats with Earthquake bar   Added 4/29:  Patient requires significant VC/TC for correct technique and sequencing   TRX Full Lunges      TRX Squat + Heel Crush & DF    TRX Squats    Rebounder Tosses     TM Mush     TM Amb  Warm-Up     Upright Bike     Standing Quad Stretch     Lateral band walks     Stair Ambulation  (w/o handrail)    3-point excursion     Sport Cord        Cybex Leg Press        Quantum Knee Ext          Quantum Knee Curl   180# 3 x 10 DL  50# 3 x 10 DL          50# 3 x 10 DL   L4, B2          Knee C, Ankle 2: 1 set neutral, 1 set adducted, 1 set DF'd    Knee 3: Ankle 2; 1 set adducted w/ DL, 1 set DF'd   Forward step-ups on BOSU with sustained SLS     6'' Forward/RetroStep Overs    8'' Forward/Retro Step Overs     TG Glute Press in Qped     TG Sidelying Single Leg Press     Spine Neutral:  Hip Hinges    Squats    Edu       SLS progression     Star drill 5 point touch Cones x 3    Dynamic Warm Up >Heel Walk  >Toe Walk  >Quad Walk   >Fig 4 Walk  >Lateral Squat    >Helicopter 1/2 lunges  >Lateral Band Walk - blue (laces)  >Dynamic Hamstring    > Deadlift Walk      Hip Abduction Machine    Hip Adduction        FM squat     Y balance SLS MaMax     SLR Adduction     Stairs Up/Down     KB Squats (to 6'' step)  <<cued for focus on hamstring/gluteal attachment site   Split Squat    Lunges     Single leg deadlift -   <<fatigue set in, LLE shake noted   Ladders    [employer]    Box Hops    Sport Cord (doubled - facing away from anchor)    Bent Over HS Curls      Bent Over SLR Extension    Standing Lateral Kicks    Running Man    SKIP W2D1  (25sec modification throughout) Wall Taps  Squat Jumps  Tuck Jumps  180 jumps  Barrier Jumps (Fwd/Retro)   Broad Jumps - 5x  8'' box jump - 3 x 5   (forward EACH)   Form Drills    Lunges with gliders Forward, back and sideways x 10 each R/L   Double leg RDL  Green shila higuera 2x10                 Other Activities:    6/3 - The patient was provided an assessment including evaluative tests and measures, as well as documentation to track progress. The patient was physical performance tested with isokinetic biodex equipment. - 39' 4/12 - The patient was provided an assessment including evaluative & functional tests and measures, as well as documentation to track progress. Extensive discussion with patient regarding return to work guidelines, and response needed from [employer] regarding disability benefits claim. Patient and PT discussion regarding HEP performance, as well as update of this program (please see below). - 60'  3/29 - Gait training was performed on this date w/o AD in order to increase gait speed, normalize arm swing, and improve heel strike/push-off with the LLE. Tactile and verbal cues were used in order to normalize gait mechanics. Patient demo'd good carryover at end of session. - 12'  3/26 - PT adjusted brace per MD recommendations (hip unlocked fully). Patient educated on exercising continued caution when out of brace. 3/22 - patient educated on WBAT, progression of WB, POC, scheduling aqua/dryland, and precautions at this time. Patient was given tour of pool, instructed on use of lockers and shown PT aquatic equipment. 2/26 - Patient educated on the focus of skilled physical therapy services and plan of care, including: diagnosis, prognosis, treatment goals & options. Patient educated to continue to wear brace at night with sleeping; encouraged patient to continue to try to sit normally. Patient encouraged/educated to maintain touch down weight bearing at this time, and was educated on reducing axillary compression with B crutches. Home Exercise Program:    4/29 - Patient encouraged to continue with current HEP on a daily basis until her next PT appointment.  Patient instructed to progress HEP exercises as she feels necessary through increasing number of sets and repetitions. Patient will avoid using resistance for HEP exercises until initiating more aggressive strengthening in the clinic under supervision of PT.  4/16 - verbally discussed and recommended standing quad stretch after proper performance in clinic. 4/12 - The following exercises were discussed and added to the patient's home exercise program. (supine SLR flex, qped glute kicks, qped LE extension, standing HF stretch, qped hip abd+ER). Additionally, the patient was educated on appropriate frequency, intensity and duration. Handout provided. Patient's previous HEP sheets were edited and highlighted to remove more simple exercises (from 2/26 and portions of 3/4), and to progress resistive exercises. Patient's questions were answered. (time included in 'other activities' above). 4/9 - green TB provided for standing SLR 3 way. 4/5 - The following exercises were performed and added to the patient's home exercise program. (SKTC, piriformis S, SB bridges, SB straight leg bridges, SB HS Curls, SB reverse crunch). Additionally, the patient was educated on appropriate frequency, intensity and duration. Handout provided. 3/29 - Patient instructed to continue gentle HSS and gasroc stretch. HF stretch not appropriate at this time as patient demos functional hip extension ROM and does not feel stretch with such activity. 3/26 - The following exercises were performed and added to the patient's home exercise program. (HSS GENTLE, gastroc S). Additionally, the patient was educated on appropriate frequency, intensity and duration. Handout provided.   3/12 - The following exercises were performed and added to the patient's home exercise program. (SLR abd, sidelying clamshells, adductor isometrics, prone knee flexion [pain free], prone SLR ext [pain free]), Additionally, the patient was educated on appropriate frequency, intensity and duration. Patient heavily edu'd to avoid pain, even slightly with prone knee flexion or prone SLR ext.  3/4 - The following exercises were performed and added to the patient's home exercise program. (SLR abd, SLR ext (in standing), standing TKE, glute sets). Additionally, the patient was educated on appropriate frequency, intensity and duration. Handout provided. 2/26 - The following exercises were performed and added to the patient's home exercise program (arabella pumps, quad sets, abdominal isometrics, prone laying). Additionally, the patient was educated on appropriate frequency, intensity and duration to perform. A detailed handout was provided to the patient. Manual Treatments:    5/8 - brief HS sticking LLE - 3'  4/29 - Gentle manual static hamstring stretch to LLE - 5'  3/12 - prone QUAD stretch; brief assessment of incision and sensitivity near incision - 5'  3/4 - knee flex/ext PROM (full ROM), hip flex to 80deg - 10'  2/26 - consider manual PROM (NO HIP FLEXION WITH KNEE EXTENSION)       Modalities: 2/26 - Consider MOC      Timed Code Treatment Minutes: 54    Total Treatment Minutes: 54     [] EVAL - LOW (13497)   [] EVAL - MOD (25329)  [] EVAL - HIGH (41490)  [] RE-EVAL (18977)  [x] ER(11781) x 2      [] IONTO  [x] NMR (98772) x 1    [] VASO  [] Manual (34795) x      [] Other: Phys Performance Testing x   [x] TA x  1    [] Mech Traction (02191)  [] ES(attended) (29435)      [] ES (un) (88250)  [] Aqua (77782) x   [] Group (22594) x 1  [] Gait training (12354) x      Treatment/Activity Tolerance:  [x] Patient tolerated treatment well [] Patient limited by fatigue  [] Patient limited by pain  [] Patient limited by other medical complications  [x] Other: fair toleration of plyometrics today, no reports of pain however fatigues VERY quickly with plyos limiting SL lateral hops today. Progression with therex per log, overall tolerated strength activity well with no issues.         Prognosis: [x] Good [] Fair  [] Poor      Patient Requires Follow-up: [x] Yes  [] No     Plan:   [x] Continue per plan of care [] Alter current plan (see comments)  [] Plan of care initiated [] Hold pending MD visit [] Discharge      Plan for Next Session: PROGRESS CKC ACTIVITY, HAMSTRING FLEXIBILITY AND STRENGTHENING, SQUATTING MECHANICS, BALANCE AND PROPRIOCEPTION DRILLS, ECCENTRIC CONTROL OF L QUAD AND GLUTE  SEE PROXIMAL HAMSTRING REPAIR REHABILITATION OF DR Mendoza , [address] [address] D2     Electronically signed by:  Bhupendra Gibbons, PT, DPT

## 2019-06-25 ENCOUNTER — HOSPITAL ENCOUNTER (OUTPATIENT)
Dept: PHYSICAL THERAPY | Age: 59
Setting detail: THERAPIES SERIES
Discharge: HOME | End: 2019-06-25
Payer: COMMERCIAL

## 2019-06-25 PROCEDURE — 97530 THERAPEUTIC ACTIVITIES: CPT

## 2019-06-25 PROCEDURE — 97110 THERAPEUTIC EXERCISES: CPT

## 2019-06-25 PROCEDURE — 97112 NEUROMUSCULAR REEDUCATION: CPT

## 2019-06-25 NOTE — FLOWSHEET NOTE
Physical Therapy Daily Treatment Note  Date:  2019    Patient Name:  Esther Melchor    :  1960  MRN: [id number]     Restrictions/Precautions:  AVOID EXCESSIVE HS STRETCHING OR LOADING HIP JOINT TOO HEAVILY WHEN IN DEEP RANGES OF FLEXION    Medical/Treatment Diagnosis Information:   · Diagnosis: S76.312D - Complete rupture of (L) proximal hamstring tendon, subsequent - 3 OF 4 HAMSTRINGS SURGICALLY REPAIRED ON 19  · Treatment Diagnosis: difficulty walking, L proximal hamstring repair, decreased strength, decreased flexibility, impaired balance      Tracking Information:  Physician Information Referring Practitioner: Rain Hernandez MD     Plan of Care Sent Date:  Signed Received: Y   Visit Count / Total Visits   +  +     Insurance Approved Visits    Approved Dates:     Insurance Information PT Insurance Information: ANTHEM - 80/20 - 38T PCY then precert     Progress Note/G-codes   []  Yes  [x]  No Next Due: #30     Pain level: 0/10     Subjective: The patient reports stiffness upon waking and after prolonged sitting, but overall, doing very well. Objective:      Observation:   - Pt demos decreased ankle mobility bilaterally when performing back squats with Earthquake bar.   - Pt demos improved control and confidence with step overs of 8'' box.  - cues required to avoid trunk rotation w/ qped activity   - patient able to perform step up/over fwd & retro using LLE in pool  3/19 - Pt demos incorrect motor pattern with mini-squats as evidenced by the inability to maintain upright chest position and the knees traveling too far anterior over the toes.   3/22 - patient able to ambulate WBAT in pool    3/12 - incision healing without signs of infection/irritation; small 1cm x 1cm scab formed over middle of incision; patient's skin hypersensitive in area of incision  3/4 - proper form with standing exercises   Test measurements:  6/3/19 - Isokinetic Testing / Bilateral Difference:  Quadricep 180 deg/sec: 34.5%   [x] Surplus 300 deg/sec: 4.4% [] Deficit   [x] Surplus   Hamstring 180 deg/sec: 17.9% [x] Deficit   [] Surplus 300 deg/sec: 16.9% [x] Deficit   [] Surplus   5/13 - Prone HS MMT: 15. 4#L, 23. 7#R, Seated HS MMT: 17. 3#L, 20. 3#R  4/29 - Left Hamstring 90/90 flexibility = 65deg  4/24 - Left Hamstring 90/90 flexibility = 58deg   4/5 - Left Hamstring 90/90 flexibility = 40deg     Exercises:  Exercise/Equipment Resistance/Repetitions Other comments   Seated quad set (approx 50deg knee flex)    Ankle pumps    Abdominal isometric    Prone Laying    CC Standing 3-way SLR into flex, ext, abd   4/9: CC + resistance   Standing TKE (Touchdown WB on LLE) <<standing at table   Seated Glute Sets    Standing Glute Set << standing at table    Standing L Hip marches (w/ NO knee extension) <<standing at table   Hooklying Adductor Isometric    R sidelying Clamshells    R sidelying SLR abd    Prone SLR Ext << patient heavily edu'd to avoid/stop if even slightly pained   Prone Knee Flexion L <<ankle DF'd to prevent hamstring stretch sensation; patient heavily edu'd to avoid/stop if even slightly pained   Weight Shifting into Alternating Marches    Double leg bridges    Mini-squats w/ emphasis on glute squeeze at top    3/29 - Significant cueing for correct sequencing and technique   Gait Training   3/29 - Verbal and tactile cues to increase lacy, improve arm swing, improve heel strike and push-off with LLE   Standing hamstring curl    // bars            Eulice Hidden    Patient Edu    Supine SKTC    Supine Fig 4 Piriformis S    SB Bridges    SB Straight Leg Bridges    Prone HS Curls    SB HS Curls    SB Reverse Crunch    Upright Bike    IB Gastroc 60'' R/L ea   Standing Adductor S 2 x 30'' L/R   IB HSS 1 x 60'' L/R   TG SLR Abd    TG Squats + Hip Abd TB Loop    TG Squats    Qped Birddog 4/22 - Cues used in order to decrease pelvic rotation   Lateral Heel Taps      Prone Glute Kick w/ Knee Flexed @ 90    Rockerboard    Squats with Earthquake bar   Added 4/29:  Patient requires significant VC/TC for correct technique and sequencing   TRX Posterolateral Lunges (keeping low squat position) - 2 x 10  R/L   TRX Full Lunges      TRX Squat + Heel Crush & DF    TRX Squats   90deg - 1 x 10   Rebounder Tosses     TM Mush     TM Amb  Warm-Up     Upright Bike     Standing Quad Stretch     Lateral band walks     Stair Ambulation  (w/o handrail)    3-point excursion     Sport Cord        Cybex Leg Press            Quantum Knee Ext          Quantum Knee Curl        FM SLR Ext 160# 1 x 10 DL  180# 2 x 10 DL            7.5# 2 x 10 R/L each L4, B2          Knee C, Ankle 2: 1 set neutral, 1 set adducted, 1 set DF'd    Knee 3: Ankle 2; 1 set adducted w/ DL, 1 set DF'd   Forward step-ups on BOSU with sustained SLS     6'' Forward/RetroStep Overs    8'' Forward/Retro Step Overs     TG Glute Press in Qped     TG Sidelying Single Leg Press     Spine Neutral:  Hip Hinges    Squats    Edu       SLS progression     Star drill 5 point touch     Dynamic Warm Up       Hip Abduction Machine        Hip Adduction        FM squat 60# 3 x 10 (2 sets spine neutral)70# 3 x 10 (2 sets spine neutral)40# 1 x 10 normal  40# 1 x 10 forward hack squats    Y balance SLS MaMax     SLR Adduction     Stairs Up/Down     KB Squats (to 6'' step)  <<cued for focus on hamstring/gluteal attachment site   Split Squat    Lunges     Single leg deadlift -   <<fatigue set in, LLE shake noted   Ladders    [employer]    Box Hops    Sport Cord (doubled - facing away from anchor)    Bent Over HS Curls      Bent Over SLR Extension    Standing Lateral Kicks    Running Man    SKIP W2D1  (25sec modification throughout)    (forward EACH)   Form Drills    Lunges with gliders    Double leg RDL     TM Walk >> jog 2.4 - 3.5 mph - 5' total             Other Activities:    6/3 - The patient was provided an assessment including evaluative tests and measures, as well as documentation to track progress. The patient was physical performance tested with isokinetic biodex equipment. - 39' 4/12 - The patient was provided an assessment including evaluative & functional tests and measures, as well as documentation to track progress. Extensive discussion with patient regarding return to work guidelines, and response needed from [employer] regarding disability benefits claim. Patient and PT discussion regarding HEP performance, as well as update of this program (please see below). - 60'  3/29 - Gait training was performed on this date w/o AD in order to increase gait speed, normalize arm swing, and improve heel strike/push-off with the LLE. Tactile and verbal cues were used in order to normalize gait mechanics. Patient demo'd good carryover at end of session. - 12'  3/26 - PT adjusted brace per MD recommendations (hip unlocked fully). Patient educated on exercising continued caution when out of brace. 3/22 - patient educated on WBAT, progression of WB, POC, scheduling aqua/dryland, and precautions at this time. Patient was given tour of pool, instructed on use of lockers and shown PT aquatic equipment. 2/26 - Patient educated on the focus of skilled physical therapy services and plan of care, including: diagnosis, prognosis, treatment goals & options. Patient educated to continue to wear brace at night with sleeping; encouraged patient to continue to try to sit normally. Patient encouraged/educated to maintain touch down weight bearing at this time, and was educated on reducing axillary compression with B crutches. Home Exercise Program:    4/29 - Patient encouraged to continue with current HEP on a daily basis until her next PT appointment. Patient instructed to progress HEP exercises as she feels necessary through increasing number of sets and repetitions.  Patient will avoid using resistance for HEP exercises until initiating more aggressive strengthening in the clinic under supervision of PT.  4/16 - verbally discussed and recommended standing quad stretch after proper performance in clinic. 4/12 - The following exercises were discussed and added to the patient's home exercise program. (supine SLR flex, qped glute kicks, qped LE extension, standing HF stretch, qped hip abd+ER). Additionally, the patient was educated on appropriate frequency, intensity and duration. Handout provided. Patient's previous HEP sheets were edited and highlighted to remove more simple exercises (from 2/26 and portions of 3/4), and to progress resistive exercises. Patient's questions were answered. (time included in 'other activities' above). 4/9 - green TB provided for standing SLR 3 way. 4/5 - The following exercises were performed and added to the patient's home exercise program. (SKTC, piriformis S, SB bridges, SB straight leg bridges, SB HS Curls, SB reverse crunch). Additionally, the patient was educated on appropriate frequency, intensity and duration. Handout provided. 3/29 - Patient instructed to continue gentle HSS and gasroc stretch. HF stretch not appropriate at this time as patient demos functional hip extension ROM and does not feel stretch with such activity. 3/26 - The following exercises were performed and added to the patient's home exercise program. (HSS GENTLE, gastroc S). Additionally, the patient was educated on appropriate frequency, intensity and duration. Handout provided. 3/12 - The following exercises were performed and added to the patient's home exercise program. (SLR abd, sidelying clamshells, adductor isometrics, prone knee flexion [pain free], prone SLR ext [pain free]), Additionally, the patient was educated on appropriate frequency, intensity and duration.  Patient heavily edu'd to avoid pain, even slightly with prone knee flexion or prone SLR ext.  3/4 - The following exercises were performed and added to the patient's home exercise program. (SLR abd, SLR ext (in standing), standing TKE, glute sets). Additionally, the patient was educated on appropriate frequency, intensity and duration. Handout provided. 2/26 - The following exercises were performed and added to the patient's home exercise program (arabella pumps, quad sets, abdominal isometrics, prone laying). Additionally, the patient was educated on appropriate frequency, intensity and duration to perform. A detailed handout was provided to the patient. Manual Treatments:    5/8 - brief HS sticking LLE - 3'  4/29 - Gentle manual static hamstring stretch to LLE - 5'  3/12 - prone QUAD stretch; brief assessment of incision and sensitivity near incision - 5'  3/4 - knee flex/ext PROM (full ROM), hip flex to 80deg - 10'  2/26 - consider manual PROM (NO HIP FLEXION WITH KNEE EXTENSION)       Modalities: 2/26 - Consider MOC      Timed Code Treatment Minutes: 58    Total Treatment Minutes: 58     [] EVAL - LOW (97872)   [] EVAL - MOD (87255)  [] EVAL - HIGH (13944)  [] RE-EVAL (68834)  [x] FJ(62304) x 2      [] IONTO  [x] NMR (19553) x 1    [] VASO  [] Manual (67166) x      [] Other: Phys Performance Testing x   [x] TA x  1    [] Mech Traction (45834)  [] ES(attended) (09111)      [] ES (un) (74444)  [] Aqua (35589) x   [] Group ([phone]) x   [] Gait training (63616) x      Treatment/Activity Tolerance:  [x] Patient tolerated treatment well [] Patient limited by fatigue  [] Patient limited by pain  [] Patient limited by other medical complications  [x] Other: Held plyometrics this date due to abdominal discomfort, however, the patient was able to progress resisted strength training without pain. The patient continues to progress towards LTGs of activity tolerance and strength at this time.        Prognosis: [x] Good [x] Fair  [] Poor      Patient Requires Follow-up: [x] Yes  [] No     Plan:   [x] Continue per plan of care [] Alter current plan (see comments)  [] Plan of care initiated [] Hold pending MD visit [] Discharge Plan for Next Session: PROGRESS CKC ACTIVITY, HAMSTRING FLEXIBILITY AND STRENGTHENING, SQUATTING MECHANICS, BALANCE AND PROPRIOCEPTION DRILLS, ECCENTRIC CONTROL OF L QUAD AND GLUTE  SEE PROXIMAL HAMSTRING REPAIR REHABILITATION OF DR Alban BARBOZA IN [place] , [facility] WEEK 2 D2 (modify plyometrics as needed)     Electronically signed by:  Kb Carrington, PT, DPT

## 2019-06-27 ENCOUNTER — HOSPITAL ENCOUNTER (OUTPATIENT)
Dept: PHYSICAL THERAPY | Age: 59
Setting detail: THERAPIES SERIES
Discharge: HOME | End: 2019-06-27
Payer: COMMERCIAL

## 2019-06-27 PROCEDURE — 97112 NEUROMUSCULAR REEDUCATION: CPT

## 2019-06-27 PROCEDURE — 97110 THERAPEUTIC EXERCISES: CPT

## 2019-06-27 PROCEDURE — 97530 THERAPEUTIC ACTIVITIES: CPT

## 2019-06-27 NOTE — FLOWSHEET NOTE
Physical Therapy Daily Treatment Note  Date:  2019    Patient Name:  Brunilda Zaragoza    :  1960  MRN: [id number]     Restrictions/Precautions:  AVOID EXCESSIVE HS STRETCHING OR LOADING HIP JOINT TOO HEAVILY WHEN IN DEEP RANGES OF FLEXION    Medical/Treatment Diagnosis Information:   · Diagnosis: S76.312D - Complete rupture of (L) proximal hamstring tendon, subsequent - 3 OF 4 HAMSTRINGS SURGICALLY REPAIRED ON 19  · Treatment Diagnosis: difficulty walking, L proximal hamstring repair, decreased strength, decreased flexibility, impaired balance      Tracking Information:  Physician Information Referring Practitioner: Marylen Amato, MD     Plan of Care Sent Date:  Signed Received: Y   Visit Count / Total Visits   +  +     Insurance Approved Visits    Approved Dates:     Insurance Information PT Insurance Information: ANTHEM - 80/20 - 49S PCY then precert     Progress Note/G-codes   []  Yes  [x]  No Next Due: #30      Pain level: 0/10     Subjective: The patient reports feeling tired today, no other reports. Objective:      Observation:   - Pt demos decreased ankle mobility bilaterally when performing back squats with Earthquake bar.   - Pt demos improved control and confidence with step overs of 8'' box.  - cues required to avoid trunk rotation w/ qped activity   - patient able to perform step up/over fwd & retro using LLE in pool  3/19 - Pt demos incorrect motor pattern with mini-squats as evidenced by the inability to maintain upright chest position and the knees traveling too far anterior over the toes.   3/22 - patient able to ambulate WBAT in pool    3/12 - incision healing without signs of infection/irritation; small 1cm x 1cm scab formed over middle of incision; patient's skin hypersensitive in area of incision  3/4 - proper form with standing exercises   Test measurements:  6/3/19 - Isokinetic Testing / Bilateral Difference:  Quadricep 180 deg/sec: 34.5%   [x] Surplus 300 deg/sec: 4.4% [] Deficit   [x] Surplus   Hamstring 180 deg/sec: 17.9% [x] Deficit   [] Surplus 300 deg/sec: 16.9% [x] Deficit   [] Surplus   5/13 - Prone HS MMT: 15. 4#L, 23. 7#R, Seated HS MMT: 17. 3#L, 20. 3#R  4/29 - Left Hamstring 90/90 flexibility = 65deg  4/24 - Left Hamstring 90/90 flexibility = 58deg   4/5 - Left Hamstring 90/90 flexibility = 40deg     Exercises:  Exercise/Equipment Resistance/Repetitions Other comments   Seated quad set (approx 50deg knee flex)    Ankle pumps    Abdominal isometric    Prone Laying    CC Standing 3-way SLR into flex, ext, abd   4/9: CC + resistance   Standing TKE (Touchdown WB on LLE) <<standing at table   Seated Glute Sets    Standing Glute Set << standing at table    Standing L Hip marches (w/ NO knee extension) <<standing at table   Hooklying Adductor Isometric    R sidelying Clamshells    R sidelying SLR abd    Prone SLR Ext << patient heavily edu'd to avoid/stop if even slightly pained   Prone Knee Flexion L <<ankle DF'd to prevent hamstring stretch sensation; patient heavily edu'd to avoid/stop if even slightly pained   Weight Shifting into Alternating Marches    Double leg bridges    Mini-squats w/ emphasis on glute squeeze at top    3/29 - Significant cueing for correct sequencing and technique   Gait Training   3/29 - Verbal and tactile cues to increase lacy, improve arm swing, improve heel strike and push-off with LLE   Standing hamstring curl    // bars            Coleman Reinoso    Patient Edu    Supine SKTC    Supine Fig 4 Piriformis S    SB Bridges    SB Straight Leg Bridges    Prone HS Curls    SB HS Curls    SB Reverse Crunch    Upright Bike    IB Gastroc    Standing Adductor S    IB HSS    TG SLR Abd    TG Squats + Hip Abd TB Loop    TG Squats    Qped Birddog 4/22 - Cues used in order to decrease pelvic rotation   Lateral Heel Taps      Prone Glute Kick w/ Knee Flexed @ 90    Rockerboard    Squats with Earthquake bar   Added Ball Squats 5 x 10''  6th rep - butterflies to fatigue   Skaters In same row - 2 x 5    Fwd 2 laps - 20 ft                         Other Activities:    6/3 - The patient was provided an assessment including evaluative tests and measures, as well as documentation to track progress. The patient was physical performance tested with isokinetic biodex equipment. - 39' 4/12 - The patient was provided an assessment including evaluative & functional tests and measures, as well as documentation to track progress. Extensive discussion with patient regarding return to work guidelines, and response needed from [employer] regarding disability benefits claim. Patient and PT discussion regarding HEP performance, as well as update of this program (please see below). - 60'  3/29 - Gait training was performed on this date w/o AD in order to increase gait speed, normalize arm swing, and improve heel strike/push-off with the LLE. Tactile and verbal cues were used in order to normalize gait mechanics. Patient demo'd good carryover at end of session. - 12'  3/26 - PT adjusted brace per MD recommendations (hip unlocked fully). Patient educated on exercising continued caution when out of brace. 3/22 - patient educated on WBAT, progression of WB, POC, scheduling aqua/dryland, and precautions at this time. Patient was given tour of pool, instructed on use of lockers and shown PT aquatic equipment. 2/26 - Patient educated on the focus of skilled physical therapy services and plan of care, including: diagnosis, prognosis, treatment goals & options. Patient educated to continue to wear brace at night with sleeping; encouraged patient to continue to try to sit normally. Patient encouraged/educated to maintain touch down weight bearing at this time, and was educated on reducing axillary compression with B crutches.     Home Exercise Program:    4/29 - Patient encouraged to continue with current HEP on a daily basis until her next PT appointment. Patient instructed to progress HEP exercises as she feels necessary through increasing number of sets and repetitions. Patient will avoid using resistance for HEP exercises until initiating more aggressive strengthening in the clinic under supervision of PT.  4/16 - verbally discussed and recommended standing quad stretch after proper performance in clinic. 4/12 - The following exercises were discussed and added to the patient's home exercise program. (supine SLR flex, qped glute kicks, qped LE extension, standing HF stretch, qped hip abd+ER). Additionally, the patient was educated on appropriate frequency, intensity and duration. Handout provided. Patient's previous HEP sheets were edited and highlighted to remove more simple exercises (from 2/26 and portions of 3/4), and to progress resistive exercises. Patient's questions were answered. (time included in 'other activities' above). 4/9 - green TB provided for standing SLR 3 way. 4/5 - The following exercises were performed and added to the patient's home exercise program. (SKTC, piriformis S, SB bridges, SB straight leg bridges, SB HS Curls, SB reverse crunch). Additionally, the patient was educated on appropriate frequency, intensity and duration. Handout provided. 3/29 - Patient instructed to continue gentle HSS and gasroc stretch. HF stretch not appropriate at this time as patient demos functional hip extension ROM and does not feel stretch with such activity. 3/26 - The following exercises were performed and added to the patient's home exercise program. (HSS GENTLE, gastroc S). Additionally, the patient was educated on appropriate frequency, intensity and duration. Handout provided.   3/12 - The following exercises were performed and added to the patient's home exercise program. (SLR abd, sidelying clamshells, adductor isometrics, prone knee flexion [pain free], prone SLR ext [pain free]), Additionally, the patient was educated on appropriate Discharge      Plan for Next Session: PROGRESS CKC ACTIVITY, HAMSTRING FLEXIBILITY AND STRENGTHENING, SQUATTING MECHANICS, BALANCE AND PROPRIOCEPTION DRILLS, ECCENTRIC CONTROL OF L QUAD AND GLUTE  SEE PROXIMAL HAMSTRING REPAIR REHABILITATION OF DR Radha BARBOZA IN [employer] [facility] WEEK 2 D2 (modify plyometrics as needed)      Electronically signed by:  Capo Milian, PT, DPT

## 2019-07-01 ENCOUNTER — HOSPITAL ENCOUNTER (OUTPATIENT)
Dept: PHYSICAL THERAPY | Age: 59
Setting detail: THERAPIES SERIES
Discharge: HOME | End: 2019-07-01
Payer: COMMERCIAL

## 2019-07-01 PROCEDURE — 97110 THERAPEUTIC EXERCISES: CPT

## 2019-07-01 PROCEDURE — 97530 THERAPEUTIC ACTIVITIES: CPT

## 2019-07-01 PROCEDURE — 97112 NEUROMUSCULAR REEDUCATION: CPT

## 2019-07-01 NOTE — FLOWSHEET NOTE
per plan of care [] Alter current plan (see comments)  [] Plan of care initiated [] Hold pending MD visit [] Discharge      Plan for Next Session: PROGRESS CKC ACTIVITY, HAMSTRING FLEXIBILITY AND STRENGTHENING, SQUATTING MECHANICS, BALANCE AND PROPRIOCEPTION DRILLS, ECCENTRIC CONTROL OF L QUAD AND GLUTE  SEE PROXIMAL HAMSTRING REPAIR REHABILITATION OF DR Mendoza , 2018 [facility] 2 D2 (modify plyometrics as needed)      Electronically signed by:  Lieutenant Ford, PT, DPT

## 2019-07-03 ENCOUNTER — HOSPITAL ENCOUNTER (OUTPATIENT)
Dept: PHYSICAL THERAPY | Age: 59
Setting detail: THERAPIES SERIES
Discharge: HOME | End: 2019-07-03
Payer: COMMERCIAL

## 2019-07-03 PROCEDURE — 97530 THERAPEUTIC ACTIVITIES: CPT

## 2019-07-03 PROCEDURE — 97110 THERAPEUTIC EXERCISES: CPT

## 2019-07-03 NOTE — FLOWSHEET NOTE
Surplus 300 deg/sec: 4.4% [] Deficit   [x] Surplus   Hamstring 180 deg/sec: 17.9% [x] Deficit   [] Surplus 300 deg/sec: 16.9% [x] Deficit   [] Surplus   5/13 - Prone HS MMT: 15. 4#L, 23. 7#R, Seated HS MMT: 17. 3#L, 20. 3#R  4/29 - Left Hamstring 90/90 flexibility = 65deg  4/24 - Left Hamstring 90/90 flexibility = 58deg   4/5 - Left Hamstring 90/90 flexibility = 40deg     Exercises:  Exercise/Equipment Resistance/Repetitions Other comments   Seated quad set (approx 50deg knee flex)    Ankle pumps    Abdominal isometric    Prone Laying    CC Standing 3-way SLR into flex, ext, abd   4/9: CC + resistance   Standing TKE (Touchdown WB on LLE) <<standing at table   Seated Glute Sets    Standing Glute Set << standing at table    Standing L Hip marches (w/ NO knee extension) <<standing at table   Hooklying Adductor Isometric    R sidelying Clamshells    R sidelying SLR abd    Prone SLR Ext << patient heavily edu'd to avoid/stop if even slightly pained   Prone Knee Flexion L <<ankle DF'd to prevent hamstring stretch sensation; patient heavily edu'd to avoid/stop if even slightly pained   Weight Shifting into Alternating Marches    Double leg bridges    Mini-squats w/ emphasis on glute squeeze at top    3/29 - Significant cueing for correct sequencing and technique   Gait Training   3/29 - Verbal and tactile cues to increase lacy, improve arm swing, improve heel strike and push-off with LLE   Standing hamstring curl    // bars            Elma Hughes    Patient Edu    Supine SKTC    Supine Fig 4 Piriformis S    SB Bridges    SB Straight Leg Bridges    Prone HS Curls    SB HS Curls    SB Reverse Crunch    Upright Bike    IB Gastroc    Standing Adductor S    IB HSS 1 x 60'' L/R   TG SLR Abd    TG Squats + Hip Abd TB Loop    TG Squats    Qped Birddog 4/22 - Cues used in order to decrease pelvic rotation   Lateral Heel Taps 6'' 2 x 20 R/L ea     Prone Glute Kick w/ Knee Flexed @ 90    Rockerboard    Squats with Earthquake bar

## 2019-07-08 ENCOUNTER — HOSPITAL ENCOUNTER (OUTPATIENT)
Dept: PHYSICAL THERAPY | Age: 59
Setting detail: THERAPIES SERIES
Discharge: HOME | End: 2019-07-08
Payer: COMMERCIAL

## 2019-07-10 ENCOUNTER — APPOINTMENT (OUTPATIENT)
Dept: PHYSICAL THERAPY | Age: 59
End: 2019-07-10
Payer: COMMERCIAL

## 2019-07-10 RX ORDER — AZELASTINE HCL 205.5 UG/1
SPRAY NASAL
Qty: 90 ML | Refills: 0 | Status: SHIPPED | OUTPATIENT
Start: 2019-07-10 | End: 2019-08-08 | Stop reason: SDUPTHER

## 2019-07-16 ENCOUNTER — HOSPITAL ENCOUNTER (OUTPATIENT)
Dept: PHYSICAL THERAPY | Age: 59
Setting detail: THERAPIES SERIES
Discharge: HOME | End: 2019-07-16
Payer: COMMERCIAL

## 2019-07-18 ENCOUNTER — APPOINTMENT (OUTPATIENT)
Dept: PHYSICAL THERAPY | Age: 59
End: 2019-07-18
Payer: COMMERCIAL

## 2019-07-22 ENCOUNTER — HOSPITAL ENCOUNTER (OUTPATIENT)
Dept: PHYSICAL THERAPY | Age: 59
Setting detail: THERAPIES SERIES
Discharge: HOME | End: 2019-07-22
Payer: COMMERCIAL

## 2019-07-22 PROCEDURE — 97110 THERAPEUTIC EXERCISES: CPT

## 2019-07-22 PROCEDURE — 97530 THERAPEUTIC ACTIVITIES: CPT

## 2019-07-22 NOTE — FLOWSHEET NOTE
Physical Therapy Daily Treatment Note  Date:  2019    Patient Name:  Josep Leblanc    :  1960  MRN: [id number]     Restrictions/Precautions:  AVOID EXCESSIVE HS STRETCHING OR LOADING HIP JOINT TOO HEAVILY WHEN IN DEEP RANGES OF FLEXION    Medical/Treatment Diagnosis Information:   · Diagnosis: S76.312D - Complete rupture of (L) proximal hamstring tendon, subsequent - 3 OF 4 HAMSTRINGS SURGICALLY REPAIRED ON 19  · Treatment Diagnosis: difficulty walking, L proximal hamstring repair, decreased strength, decreased flexibility, impaired balance      Tracking Information:  Physician Information Referring Practitioner: Iram Roldan MD     Plan of Care Sent Date:  Signed Received: Y   Visit Count / Total Visits   +  +     Insurance Approved Visits    Approved Dates:     Insurance Information PT Insurance Information: ANTHEM - 80/20 - 06L PCY then precert     Progress Note/G-codes   []  Yes  [x]  No Next Due: #30      Pain level: 0/10     Subjective: The patient reports she hasn't been working out much since her break from PT, but she hasn't been having symptoms either. Objective:      Observation:   - Pt demos decreased ankle mobility bilaterally when performing back squats with Earthquake bar.   - Pt demos improved control and confidence with step overs of 8'' box.  - cues required to avoid trunk rotation w/ qped activity   - patient able to perform step up/over fwd & retro using LLE in pool  3/19 - Pt demos incorrect motor pattern with mini-squats as evidenced by the inability to maintain upright chest position and the knees traveling too far anterior over the toes.   3/22 - patient able to ambulate WBAT in pool    3/12 - incision healing without signs of infection/irritation; small 1cm x 1cm scab formed over middle of incision; patient's skin hypersensitive in area of incision  3/4 - proper form with standing exercises   Test measurements:  6/3/19 -

## 2019-07-24 ENCOUNTER — HOSPITAL ENCOUNTER (OUTPATIENT)
Dept: PHYSICAL THERAPY | Age: 59
Setting detail: THERAPIES SERIES
Discharge: HOME | End: 2019-07-24
Payer: COMMERCIAL

## 2019-07-24 PROCEDURE — 97110 THERAPEUTIC EXERCISES: CPT

## 2019-07-24 PROCEDURE — 97750 PHYSICAL PERFORMANCE TEST: CPT

## 2019-07-24 NOTE — FLOWSHEET NOTE
Double leg RDL     TM Walk >> jog    [employer] Squats    Skaters        [place] Hamstrings    Earthquake Bar:  Deadlift > OH press    Squats    Single Leg Sit to Stand (from plyobox)    BB Court          Other Activities:    7/24 - The patient was provided an assessment including evaluative tests and measures, as well as documentation to track progress. The patient was physical performance tested with isokinetic biodex equipment. - 30'  6/3 - The patient was provided an assessment including evaluative tests and measures, as well as documentation to track progress. The patient was physical performance tested with isokinetic biodex equipment. - 39' 4/12 - The patient was provided an assessment including evaluative & functional tests and measures, as well as documentation to track progress. Extensive discussion with patient regarding return to work guidelines, and response needed from [employer] regarding disability benefits claim. Patient and PT discussion regarding HEP performance, as well as update of this program (please see below). - 60'  3/29 - Gait training was performed on this date w/o AD in order to increase gait speed, normalize arm swing, and improve heel strike/push-off with the LLE. Tactile and verbal cues were used in order to normalize gait mechanics. Patient demo'd good carryover at end of session. - 12'  3/26 - PT adjusted brace per MD recommendations (hip unlocked fully). Patient educated on exercising continued caution when out of brace. 3/22 - patient educated on WBAT, progression of WB, POC, scheduling aqua/dryland, and precautions at this time. Patient was given tour of pool, instructed on use of lockers and shown PT aquatic equipment. 2/26 - Patient educated on the focus of skilled physical therapy services and plan of care, including: diagnosis, prognosis, treatment goals & options.  Patient educated to continue to wear brace at night with sleeping; encouraged patient to continue to duration. Handout provided. 3/12 - The following exercises were performed and added to the patient's home exercise program. (SLR abd, sidelying clamshells, adductor isometrics, prone knee flexion [pain free], prone SLR ext [pain free]), Additionally, the patient was educated on appropriate frequency, intensity and duration. Patient heavily edu'd to avoid pain, even slightly with prone knee flexion or prone SLR ext.  3/4 - The following exercises were performed and added to the patient's home exercise program. (SLR abd, SLR ext (in standing), standing TKE, glute sets). Additionally, the patient was educated on appropriate frequency, intensity and duration. Handout provided. 2/26 - The following exercises were performed and added to the patient's home exercise program (arabella pumps, quad sets, abdominal isometrics, prone laying). Additionally, the patient was educated on appropriate frequency, intensity and duration to perform. A detailed handout was provided to the patient.     Manual Treatments:    5/8 - brief HS sticking LLE - 3'  4/29 - Gentle manual static hamstring stretch to LLE - 5'  3/12 - prone QUAD stretch; brief assessment of incision and sensitivity near incision - 5'  3/4 - knee flex/ext PROM (full ROM), hip flex to 80deg - 10'  2/26 - consider manual PROM (NO HIP FLEXION WITH KNEE EXTENSION)       Modalities: 2/26 - Consider MOC      Timed Code Treatment Minutes: 50    Total Treatment Minutes: 50     [] EVAL - LOW (01866)    [] EVAL - MOD (90962)  [] EVAL - HIGH (30519)  [] RE-EVAL (43981)  [x] UH(06077) x 2     [] IONTO  [] NMR (32754) x     [] VASO  [] Manual (68626) x      [x] Other: Phys Performance Testing x 1  [] TA x     [] Mech Traction (00261)  [] ES(attended) (91173)      [] ES (un) (87216)  [] Aqua (25529) x   [] Group (18241) x   [] Gait training (01420) x      Treatment/Activity Tolerance:  [x] Patient tolerated treatment well [] Patient limited by fatigue  [] Patient limited by pain  []

## 2019-07-29 ENCOUNTER — HOSPITAL ENCOUNTER (OUTPATIENT)
Dept: PHYSICAL THERAPY | Age: 59
Setting detail: THERAPIES SERIES
Discharge: HOME | End: 2019-07-29
Payer: COMMERCIAL

## 2019-07-29 PROCEDURE — 97530 THERAPEUTIC ACTIVITIES: CPT

## 2019-07-29 PROCEDURE — 97112 NEUROMUSCULAR REEDUCATION: CPT

## 2019-07-29 PROCEDURE — 97110 THERAPEUTIC EXERCISES: CPT

## 2019-08-08 RX ORDER — AZELASTINE HCL 205.5 UG/1
SPRAY NASAL
Qty: 90 ML | Refills: 0 | Status: SHIPPED | OUTPATIENT
Start: 2019-08-08 | End: 2019-09-07 | Stop reason: SDUPTHER

## 2019-08-14 RX ORDER — FLUTICASONE PROPIONATE 50 MCG
SPRAY, SUSPENSION (ML) NASAL
Qty: 1 BOTTLE | Refills: 0 | Status: SHIPPED | OUTPATIENT
Start: 2019-08-14 | End: 2019-09-07 | Stop reason: SDUPTHER

## 2019-08-27 ENCOUNTER — HOSPITAL ENCOUNTER (OUTPATIENT)
Dept: PHYSICAL THERAPY | Age: 59
Setting detail: THERAPIES SERIES
Discharge: HOME | End: 2019-08-27
Payer: COMMERCIAL

## 2019-08-27 PROCEDURE — 97750 PHYSICAL PERFORMANCE TEST: CPT

## 2019-08-27 PROCEDURE — 97110 THERAPEUTIC EXERCISES: CPT

## 2019-08-27 NOTE — DISCHARGE SUMMARY
PT at this time. []Patient should continue to improve in reasonable time if they continue HEP   []Patient has plateaued and is no longer responding to skilled PT intervention    []Patient is getting worse and would benefit from return to referring MD   []Patient unable to adhere to initial POC   [x]Other: NOTE: THE PATIENT [address] THESE DEFICITS ARE COMPARING RESULTS OBTAINED ON ; HOWEVER, THE PATIENT CONTINUES TO DEMONSTRATE 5/5 MMT FOR BLEs AND NORMALIZED TISSUE FLEXIBILITY. LONG TERM GOALS HAVE BEEN MET FOR THE PATIENT. Date range of Visits: 1/10/19 - 19  Total Visits: 32    Recommendation:    [x]DC FROM PT SERVICES    Physical Therapy Daily Treatment Note  Date:  2019    Patient Name:  Leola Rod    :  1960  MRN: [id number]     Restrictions/Precautions:      Medical/Treatment Diagnosis Information:   · Diagnosis: S76.312D - Complete rupture of (L) proximal hamstring tendon, subsequent - 3 OF 4 HAMSTRINGS SURGICALLY REPAIRED ON 19  · Treatment Diagnosis: difficulty walking, L proximal hamstring repair, decreased strength, decreased flexibility, impaired balance      Tracking Information:  Physician Information Referring Practitioner: Indu Orourke MD     Plan of Care Sent Date:  Signed Received: Y   Visit Count / Total Visits   +  +     Insurance Approved Visits    Approved Dates:     Insurance Information PT Insurance Information: ANTHEM - 80/20 - 29A PCY then precert     Progress Note/G-codes   [x]  Yes  []  No Next Due: #32 (PN before MD visit)       Pain level: 0/10     Subjective:  SEE PN    Objective:    SEE PN  Observation:   - Pt demos decreased ankle mobility bilaterally when performing back squats with Earthquake bar.   - Pt demos improved control and confidence with step overs of 8'' box.    - cues required to avoid trunk rotation w/ qped activity   - patient able to perform step up/over fwd & retro using LLE in pool  3/19 - Pt demos incorrect motor pattern with mini-squats as evidenced by the inability to maintain upright chest position and the knees traveling too far anterior over the toes. 3/22 - patient able to ambulate WBAT in pool    3/12 - incision healing without signs of infection/irritation; small 1cm x 1cm scab formed over middle of incision; patient's skin hypersensitive in area of incision  3/4 - proper form with standing exercises     Test measurements:  8/27 -  Isokinetic Testing / Bilateral Difference:   Quadricep 180 deg/sec: 6.0%   [x] Deficit 300 deg/sec: 7.8%   [x] Deficit   Hamstring 180 deg/sec: 5.7% [x] Deficit   300 deg/sec: 2.9%  [x] Deficit   *NOTE: THE PATIENT HAS BEEN OUT OF TOWN AND PERFORMING HEP FOR ONE MONTH. THESE DEFICITS ARE COMPARING RESULTS OBTAINED ON 7/24. THE PATIENT CONTINUES TO DEMONSTRATE 5/5 MMT FOR BLEs AND NORMALIZED TISSUE FLEXIBILITY. 7/24 - HS 90/90 = R (lack 10) L (lack 0)   Isokinetic Testing / Bilateral Difference:   Quadricep 180 deg/sec: 23.6%   [x] Surplus 300 deg/sec: 14.5% []    [x] Surplus   Hamstring 180 deg/sec: 21.8% [x] Deficit   [] Surplus 300 deg/sec: 1.8%    [x] Surplus       6/3/19 - Isokinetic Testing / Bilateral Difference:  Quadricep 180 deg/sec: 34.5%   [x] Surplus 300 deg/sec: 4.4% [] Deficit   [x] Surplus   Hamstring 180 deg/sec: 17.9% [x] Deficit   [] Surplus 300 deg/sec: 16.9% [x] Deficit   [] Surplus   5/13 - Prone HS MMT: 15. 4#L, 23. 7#R, Seated HS MMT: 17. 3#L, 20. 3#R  4/29 - Left Hamstring 90/90 flexibility = 65deg  4/24 - Left Hamstring 90/90 flexibility = 58deg   4/5 - Left Hamstring 90/90 flexibility = 40deg     Exercises:   Exercise/Equipment Resistance/Repetitions Other comments   Seated quad set (approx 50deg knee flex)    Ankle pumps    Abdominal isometric    Prone Laying    CC Standing 3-way SLR into flex, ext, abd   4/9: CC + resistance   Standing TKE (Touchdown WB on LLE) <<standing at table   Seated sustained SLS    6'' Forward/RetroStep Overs    8'' Forward/Retro Step Overs    TG Glute Press in Qped    TG Sidelying Single Leg Press    Spine Neutral:  Hip Hinges    Squats    Edu    SLS progression    Star drill 5 point touch    Dynamic Warm Up - 1 lap ea    Hip Abduction Machine          Hip Adduction        FM squat        Ropes      Sled Push/Pull    Y balance SLS MaMax    SLR Adduction    Stairs Up/Down    KB Squats (to 6'' step) <<cued for focus on hamstring/gluteal attachment site   Split Squat Heavy cues from PT to correct back foot position and to prevent excessive front knee translation   Lunges     Single leg deadlift -  <<fatigue set in, LLE shake noted   Ladders    [employer]    Box Hops    Sport Cord (doubled - facing away from anchor)    Bent Over HS Curls      Bent Over SLR Extension    Standing Lateral Kicks    Running Man    SKIP W2D1  (25sec modification throughout)    Form Drills    Lunges with gliders    Double leg RDL     TM Walk >> jog    [employer] Squats    Skaters    [place] Hamstrings    Earthquake Bar:  Deadlift > OH press    Squats    Single Leg Sit to Stand (from plyobox)    BB Court    BOSU Squats    SLR Ext/Abd  Prone HS curls      Other Activities:    8/27 - The patient was provided a final assessment including evaluative tests and measures, as well as discharge documentation to track progress, and was issued a 1-month [employer] Pass to continue HEP. This time included isokinetic special testing. - 30' 7/24 - The patient was provided an assessment including evaluative tests and measures, as well as documentation to track progress. The patient was physical performance tested with isokinetic biodex equipment. - 30'  6/3 - The patient was provided an assessment including evaluative tests and measures, as well as documentation to track progress. The patient was physical performance tested with isokinetic biodex equipment.  - 39'  4/12 - The patient was provided an assessment including evaluative & functional tests and measures, as well as documentation to track progress. Extensive discussion with patient regarding return to work guidelines, and response needed from [employer] regarding disability benefits claim. Patient and PT discussion regarding HEP performance, as well as update of this program (please see below). - 60'  3/29 - Gait training was performed on this date w/o AD in order to increase gait speed, normalize arm swing, and improve heel strike/push-off with the LLE. Tactile and verbal cues were used in order to normalize gait mechanics. Patient demo'd good carryover at end of session. - 12'  3/26 - PT adjusted brace per MD recommendations (hip unlocked fully). Patient educated on exercising continued caution when out of brace. 3/22 - patient educated on WBAT, progression of WB, POC, scheduling aqua/dryland, and precautions at this time. Patient was given tour of pool, instructed on use of lockers and shown PT aquatic equipment. 2/26 - Patient educated on the focus of skilled physical therapy services and plan of care, including: diagnosis, prognosis, treatment goals & options. Patient educated to continue to wear brace at night with sleeping; encouraged patient to continue to try to sit normally. Patient encouraged/educated to maintain touch down weight bearing at this time, and was educated on reducing axillary compression with B crutches. Home Exercise Program:    7/29 - The following exercises were reprinted for the patient's home exercise program. (LP, HS curls, hip abd, hip add, knee ext, SL deadlifts, TRX squats, SLR abd/ext/flex, qped glute ext). Additionally, the patient was educated on appropriate frequency, intensity and duration. Handouts provided. Blue/purple/grey TBs provided. 4/29 - Patient encouraged to continue with current HEP on a daily basis until her next PT appointment.  Patient instructed to progress HEP exercises as she feels necessary through with prone knee flexion or prone SLR ext.  3/4 - The following exercises were performed and added to the patient's home exercise program. (SLR abd, SLR ext (in standing), standing TKE, glute sets). Additionally, the patient was educated on appropriate frequency, intensity and duration. Handout provided. 2/26 - The following exercises were performed and added to the patient's home exercise program (arabella pumps, quad sets, abdominal isometrics, prone laying). Additionally, the patient was educated on appropriate frequency, intensity and duration to perform. A detailed handout was provided to the patient.     Manual Treatments:    5/8 - brief HS sticking LLE - 3'  4/29 - Gentle manual static hamstring stretch to LLE - 5'  3/12 - prone QUAD stretch; brief assessment of incision and sensitivity near incision - 5'  3/4 - knee flex/ext PROM (full ROM), hip flex to 80deg - 10'  2/26 - consider manual PROM (NO HIP FLEXION WITH KNEE EXTENSION)       Modalities: 2/26 - Consider MOC      Timed Code Treatment Minutes: 30    Total Treatment Minutes: 30     [] EVAL - LOW (10000)    [] EVAL - MOD (82036)  [] EVAL - HIGH (72009)  [] RE-EVAL (28837)  [x] ZA(44298) x 1     [] IONTO  [] NMR (64256) x     [] VASO  [] Manual (83352) x      [x] Other: Phys Performance Testing x 1   [] TA x      [] Mech Traction (14773)  [] ES(attended) (10012)      [] ES (un) (65327)  [] Aqua (93208) x   [] Group ([phone]) x   [] Gait training (48729) x      Treatment/Activity Tolerance:  [x] Patient tolerated treatment well [] Patient limited by fatigue  [] Patient limited by pain  [] Patient limited by other medical complications  [x] Other: SEE ABOVE       Prognosis: [x] Good [] Fair  [] Poor      Patient Requires Follow-up: [] Yes  [x] No     Plan:   [] Continue per plan of care [] Alter current plan (see comments)  [] Plan of care initiated [] Hold pending MD visit [x] Discharge      Plan for Next Session: DC      Electronically signed by:  Chiquis Beal Festus, PT, DPT

## 2019-08-29 ENCOUNTER — OFFICE VISIT (OUTPATIENT)
Dept: ORTHOPEDIC SURGERY | Age: 59
End: 2019-08-29
Payer: COMMERCIAL

## 2019-08-29 VITALS
HEART RATE: 85 BPM | HEIGHT: 67 IN | RESPIRATION RATE: 16 BRPM | SYSTOLIC BLOOD PRESSURE: 143 MMHG | WEIGHT: 226 LBS | BODY MASS INDEX: 35.47 KG/M2 | DIASTOLIC BLOOD PRESSURE: 90 MMHG

## 2019-08-29 DIAGNOSIS — S76.312D COMPLETE RUPTURE OF LEFT PROXIMAL HAMSTRING TENDON, SUBSEQUENT ENCOUNTER: Primary | ICD-10-CM

## 2019-08-29 PROCEDURE — 99213 OFFICE O/P EST LOW 20 MIN: CPT | Performed by: ORTHOPAEDIC SURGERY

## 2019-09-04 ENCOUNTER — APPOINTMENT (OUTPATIENT)
Dept: PHYSICAL THERAPY | Age: 59
End: 2019-09-04
Payer: COMMERCIAL

## 2019-09-09 RX ORDER — FLUTICASONE PROPIONATE 50 MCG
SPRAY, SUSPENSION (ML) NASAL
Qty: 3 BOTTLE | Refills: 3 | Status: SHIPPED | OUTPATIENT
Start: 2019-09-09

## 2019-09-09 RX ORDER — AZELASTINE HCL 205.5 UG/1
SPRAY NASAL
Qty: 90 ML | Refills: 0 | Status: SHIPPED | OUTPATIENT
Start: 2019-09-09 | End: 2019-10-07 | Stop reason: SDUPTHER

## 2019-10-07 RX ORDER — AZELASTINE HCL 205.5 UG/1
SPRAY NASAL
Qty: 90 ML | Refills: 0 | Status: SHIPPED | OUTPATIENT
Start: 2019-10-07

## 2020-01-07 RX ORDER — CETIRIZINE HYDROCHLORIDE 10 MG/1
10 TABLET ORAL DAILY
Qty: 90 TABLET | Refills: 1 | Status: SHIPPED | OUTPATIENT
Start: 2020-01-07

## 2021-11-11 NOTE — PROGRESS NOTES
Outpatient Physical Therapy    Phone: [phone] Fax: [phone]    Physical Therapy Progress Note  Date: 6/3/2019        Patient Name:  Tiffanie Amaya    :  1960  MRN: [id number]  Restrictions/Precautions:      Medical/Treatment Diagnosis Information:  · Diagnosis: S76.312D - Complete rupture of (L) proximal hamstring tendon, subsequent  · Treatment Diagnosis: L proximal hamstring repair, decreased strength, decreased flexibility, decreased activity tolerance  Insurance/Certification information:  PT Insurance Information: [address] then precert  Physician Information:  Referring Practitioner: Jessica Mcgrath MD  Plan of care signed (Y/N): Y  Visit# / total visits:    Pain level: 0/10     Time Period for Report: 19 - 6/3/19   Cancels/No-shows to date: 2    Plan of Care/Treatment to date:  X Therapeutic Exercise      X Modalities:  X Therapeutic Activity        ? Ultrasound    X Gait Training         ? Cervical Traction   X Neuromuscular Re-education      ? Cold/hotpack    X Instruction in HEP        ? Lumbar Traction  X Manual Therapy        ? Electrical Stimulation            ? Aquatic Therapy        ? Iontophoresis            ? Lymphedema management  ? Women's Health     Other:  ? Vestibular Rehab        ?    ?  Needed                        Significant Findings At Last Visit/Comments:    Subjective:  Subjective  Subjective: The patient reports stiffness is an ongoing problem, mostly in hips. She reports with forward bending or stooping, she still feels pull in left hamstring, which limits ability to bend forward and lift items from floor. She is into the phase where aspects of musculoskeletal function are improving slowly, but steadily. She denies problems with steps any longer. She is now 16 weeks post-op hamstring repair.   Pain Screening  Patient Currently in Pain: No         Objective:         Observation/Palpation  Palpation: +0 TTP ischial tuberosity (L), +1 (L) adductor insertion group  Observation: patient ambulates without brace or AD  AROM LLE (degrees)  L Hip Flexion 0-125: WNL  L Knee Flexion 0-145: prone knee flex = 120deg  L Knee Extension 0: 0deg  Strength LLE  L Hip Flexion: 5/5  L Hip Extension: 4+/5  L Hip ABduction: 4+/5  L Hip Internal Rotation: 5/5  L Hip External Rotation: 5/5  L Knee Flexion: 4+/5  L Knee Extension: 5/5  Strength Other  Other: L HS Peak TQ/BW deficit @180deg/sec HS = 17.9% (compared to R), L HS Peak TQ/BW deficit @ 300deg/sec HS = 16.9% (compared to R)  Tone RLE  RLE Tone: Normotonic  Tone LLE  LLE Tone: Normotonic  Motor Control  Gross Motor?: WFL  Additional Measures  Flexibility: HS 90/90 L: 80, L quad (heel to ischial tub - in prone): 3-4cm  Special Tests: 30sec SitToStand = 19 repetitions; 6'' lateral heel tap test: R = 30x, L = 29x  Sensation  Overall Sensation Status: WNL  Bed Mobility  Bridging: Independent  Scooting: Independent  Transfers  Sit to Stand: Independent  Stand to sit: Independent  Bed to Chair: Independent  Ambulation  Ambulation?: Yes  Ambulation 1  Surface: level tile  Device: No Device  Quality of Gait: trendelenburg gait in R midstance, slightly decreased L hip extension in terminal stance  Balance  Posture: Fair  Sitting - Static: Good  Sitting - Dynamic: Good  Standing - Static: Good  Standing - Dynamic: Fair, +  Comments: Narrow DONTAE = 10 sec, Semi-tandem (L) = 10 sec, Tandem (L) = 10 sec, SLS (L) = 10 sec    Functional Outcome Measures:  LEFS Total Score: 60         Assessment:  Conditions Requiring Skilled Therapeutic Intervention  Body structures, Functions, Activity limitations: Decreased strength, Decreased high-level IADLs, Decreased endurance, Decreased ADL status  Assessment: The patient is now 16 weeks s/p hamstring repair and has made significant improvements in strength, ROM, flexibility, endurance, and activity tolerance. Her gait is normalizing as well.  The patient's isokinetic testing reveals only 17.9% and 16.9% deficit of L hamstring compared to R in both 180deg/sec and 300deg/sec tests, respectively. The patient's left quadriceps is performing at greater peak torque to body weight compared to right at this time. The patient's functional limitations include, prolonged sitting, running, making sharp turns, jumping/hopping, prolonged/repetitive CKC activity. The patient should continue skilled PT at this time, as she is ready to begin/progress plyometrics, as well as progression of strength/endurance needed to restore PLOF. Treatment Diagnosis: L proximal hamstring repair, decreased strength, decreased flexibility, decreased activity tolerance  Prognosis: Excellent  REQUIRES PT FOLLOW UP: Yes    Plan:   Plan  Times per week: 2  Plan weeks: 6  Current Treatment Recommendations: Strengthening, IADL Training, Neuromuscular Re-education, Home Exercise Program, ROM, Manual Therapy - Soft Tissue Mobilization, Safety Education & Training, Balance Training, Endurance Training, Patient/Caregiver Education & Training, Functional Mobility Training, Manual Therapy - Joint Manipulation, Transfer Training, Gait Training, Modalities, ADL/Self-care Training, Stair training, Pain Management, Positioning, Equipment Evaluation, Education, & procurement, Aquatics          Progress towards goals:    Short term goals  Time Frame for Short term goals: 12 weeks s/p  Short term goal 1: patient will step up/down w/ LLE 10x on 6'' step with good control and no pain - GOAL MET - New Goal - Patient will increase 30s STS score by at least 5 repetitions in order to increase BLE strength and transfer abilities.  - GOAL MET  Short term goal 2: patient will squat >60deg 10x with good control and no pain - GOAL MET  Short term goal 3: patient will perform 10x partial lunge <60 deg knee flexion w/ good control and no pain - GOAL MET  Long term goals  Time Frame for Long term goals : 6 months post op  Long term goal 1: The patient will ambulate up/down 27 stairs w/o use of handrail w/ good control and no pain - PARTIALLY MET  Long term goal 2: Patient will demonstrate 5/5 strength throughout L hip extensors, abductors, hamstrings and quadriceps - PROGRESSING  Long term goal 3: Patient will demonstrate 60:40 peak torque quad to hamstring on biodex isokinetic testing -  PROGRESSING    Current Frequency/Duration:  # Days per week: ? 1 day # Weeks: ? 1 week ? 4 weeks      X 2 days   ? 2 weeks ? 5 weeks      ? 3 days   ? 3 weeks X 6 weeks     Rehab Potential: X Excellent ? Good ? Fair  ? Poor     Goal Status:  ? Achieved X Partially Achieved  ? Not Achieved     Patient Status: X Continue per initial plan of Care     ? Patient now discharged     X Additional visits requested, Please re-certify for additional visits:      Requested frequency/duration:  2X/week for 4-6 weeks    Electronically signed by:  Aurora Layton PT    Thank you for this kind and gracious referral for skilled PT services.  I appreciate the opportunity to take part in coordinating and providing care for this individual.     Sincerely,    Sarah Kelsey PT, DPT        Physician Signature:________________________________Date:__________________  By signing above, therapists plan is approved by physician Mohs Case Number:

## (undated) DEVICE — 3M™ STERI-DRAPE™ U-DRAPE 1015: Brand: STERI-DRAPE™

## (undated) DEVICE — GLOVE ORANGE PI 7   MSG9070

## (undated) DEVICE — YANKAUER SUCTION INSTRUMENT NO CONTROL VENT, BULB TIP, CLEAR: Brand: YANKAUER

## (undated) DEVICE — STOCKINETTE,IMPERVIOUS,12X48,STERILE: Brand: MEDLINE

## (undated) DEVICE — DRAPE C ARM UNIV W41XL74IN CLR PLAS XR VELC CLSR POLY STRP

## (undated) DEVICE — SPONGE LAP W18XL18IN WHT COT 4 PLY FLD STRUNG RADPQ DISP ST

## (undated) DEVICE — SUTURE VCRL SZ 2-0 L18IN ABSRB UD CT-1 L36MM 1/2 CIR J839D

## (undated) DEVICE — PACK PROCEDURE SURG EXTREMITY MFFOP CUST

## (undated) DEVICE — DRAPE,U/ SHT,SPLIT,PLAS,STERIL: Brand: MEDLINE

## (undated) DEVICE — GOWN SIRUS NONREIN XL W/TWL: Brand: MEDLINE INDUSTRIES, INC.

## (undated) DEVICE — SYSTEM SKIN CLSR 22CM DERMBND PRINEO

## (undated) DEVICE — ELECTRODE PT RET AD L9FT HI MOIST COND ADH HYDRGEL CORDED

## (undated) DEVICE — SYRINGE 60ML BULB IRRIG ST LF

## (undated) DEVICE — STERILE LATEX POWDER-FREE SURGICAL GLOVESWITH NITRILE COATING: Brand: PROTEXIS

## (undated) DEVICE — SOLUTION IV IRRIG 500ML 0.9% SODIUM CHL 2F7123

## (undated) DEVICE — BANDAGE COMPR W6INXL10YD ST M E WHITE/BEIGE

## (undated) DEVICE — BLADE SURG NO10 S STL STR DISP GLASSVAN

## (undated) DEVICE — STERILE POLYISOPRENE POWDER-FREE SURGICAL GLOVES: Brand: PROTEXIS

## (undated) DEVICE — SHEET,DRAPE,53X77,STERILE: Brand: MEDLINE

## (undated) DEVICE — CHLORAPREP 26ML ORANGE

## (undated) DEVICE — SUTURE MCRYL SZ 4-0 L27IN ABSRB UD L19MM PS-2 1/2 CIR PRIM Y426H

## (undated) DEVICE — LIGHT HANDLE: Brand: DEVON

## (undated) DEVICE — YANKAUER,BULB TIP,W/O VENT,RIGID,STERILE: Brand: MEDLINE

## (undated) DEVICE — PAD,NON-ADHERENT,3X8,STERILE,LF,1/PK: Brand: MEDLINE

## (undated) DEVICE — DRAPE,SPLIT ,77X120: Brand: MEDLINE

## (undated) DEVICE — Device

## (undated) DEVICE — STANDARD HYPODERMIC NEEDLE,POLYPROPYLENE HUB: Brand: MONOJECT

## (undated) DEVICE — BLADE ES L6IN ELASTOMERIC COAT INSUL DURABLE BEND UPTO

## (undated) DEVICE — SYRINGE IRRIG 60ML SFT PLIABLE BLB EZ TO GRP 1 HND USE W/

## (undated) DEVICE — 3M™ COBAN™ NL STERILE NON-LATEX SELF-ADHERENT WRAP, 2086S, 6 IN X 5 YD (15 CM X 4,5 M), 12 ROLLS/CASE: Brand: 3M™ COBAN™